# Patient Record
Sex: MALE | Employment: OTHER | URBAN - METROPOLITAN AREA
[De-identification: names, ages, dates, MRNs, and addresses within clinical notes are randomized per-mention and may not be internally consistent; named-entity substitution may affect disease eponyms.]

---

## 2017-06-26 ENCOUNTER — HOSPITAL ENCOUNTER (OUTPATIENT)
Dept: NEUROLOGY | Facility: CLINIC | Age: 72
Setting detail: THERAPIES SERIES
Discharge: STILL ADMITTED OR ADMINISTRATIVE | End: 2017-06-26
Attending: PSYCHIATRY & NEUROLOGY

## 2017-06-26 ENCOUNTER — HOSPITAL ENCOUNTER (OUTPATIENT)
Dept: NEUROLOGY | Facility: CLINIC | Age: 72
Setting detail: THERAPIES SERIES
Discharge: STILL ADMITTED OR ADMINISTRATIVE | End: 2017-06-26
Attending: SOCIAL WORKER

## 2017-06-26 DIAGNOSIS — E03.9 HYPOTHYROID: ICD-10-CM

## 2017-06-26 DIAGNOSIS — G20.C PARKINSONISM (H): ICD-10-CM

## 2017-06-26 DIAGNOSIS — R41.89 COGNITIVE IMPAIRMENT: ICD-10-CM

## 2017-06-28 ENCOUNTER — HOSPITAL ENCOUNTER (OUTPATIENT)
Dept: RADIOLOGY | Facility: HOSPITAL | Age: 72
Discharge: HOME | End: 2017-06-28
Attending: PSYCHIATRY & NEUROLOGY

## 2017-06-28 DIAGNOSIS — R41.89 COGNITIVE IMPAIRMENT: ICD-10-CM

## 2017-07-06 ENCOUNTER — HOSPITAL ENCOUNTER (OUTPATIENT)
Dept: PHYSICAL THERAPY | Age: 72
Setting detail: THERAPIES SERIES
Discharge: STILL ADMITTED OR ADMINISTRATIVE | End: 2017-07-06
Attending: PSYCHIATRY & NEUROLOGY

## 2017-07-06 DIAGNOSIS — R26.81 GAIT INSTABILITY: ICD-10-CM

## 2017-07-07 ENCOUNTER — HOSPITAL ENCOUNTER (OUTPATIENT)
Dept: NEUROLOGY | Facility: CLINIC | Age: 72
Setting detail: THERAPIES SERIES
Discharge: STILL ADMITTED OR ADMINISTRATIVE | End: 2017-07-07
Attending: PSYCHIATRY & NEUROLOGY

## 2017-07-07 DIAGNOSIS — G47.52 REM BEHAVIORAL DISORDER: ICD-10-CM

## 2017-07-07 DIAGNOSIS — G20.C PARKINSONISM (H): ICD-10-CM

## 2017-07-07 DIAGNOSIS — F03.91 MAJOR NEUROCOGNITIVE DISORDER DUE TO PARKINSON'S DISEASE, PROBABLE, WITH BEHAVIORAL DISTURBANCE: ICD-10-CM

## 2017-07-11 ENCOUNTER — HOSPITAL ENCOUNTER (OUTPATIENT)
Dept: MRI IMAGING | Facility: CLINIC | Age: 72
Discharge: HOME | End: 2017-07-11
Attending: PSYCHIATRY & NEUROLOGY

## 2017-07-11 DIAGNOSIS — R41.89 COGNITIVE IMPAIRMENT: ICD-10-CM

## 2017-07-17 ENCOUNTER — HOSPITAL ENCOUNTER (OUTPATIENT)
Dept: NEUROLOGY | Facility: CLINIC | Age: 72
Setting detail: THERAPIES SERIES
Discharge: STILL ADMITTED OR ADMINISTRATIVE | End: 2017-07-17
Attending: PSYCHIATRY & NEUROLOGY

## 2017-07-18 ENCOUNTER — COMMUNICATION - HEALTHEAST (OUTPATIENT)
Dept: NEUROLOGY | Facility: CLINIC | Age: 72
End: 2017-07-18

## 2017-07-24 ENCOUNTER — HOSPITAL ENCOUNTER (OUTPATIENT)
Dept: PHYSICAL THERAPY | Age: 72
Setting detail: THERAPIES SERIES
Discharge: STILL ADMITTED OR ADMINISTRATIVE | End: 2017-07-24
Attending: PSYCHIATRY & NEUROLOGY

## 2017-07-24 DIAGNOSIS — R26.81 GAIT INSTABILITY: ICD-10-CM

## 2017-07-24 DIAGNOSIS — R25.8 BRADYKINESIA: ICD-10-CM

## 2017-07-26 ENCOUNTER — HOSPITAL ENCOUNTER (OUTPATIENT)
Dept: PHYSICAL THERAPY | Age: 72
Setting detail: THERAPIES SERIES
Discharge: STILL ADMITTED OR ADMINISTRATIVE | End: 2017-07-26
Attending: PSYCHIATRY & NEUROLOGY

## 2017-07-26 DIAGNOSIS — R25.8 BRADYKINESIA: ICD-10-CM

## 2017-07-26 DIAGNOSIS — R26.81 GAIT INSTABILITY: ICD-10-CM

## 2017-07-31 ENCOUNTER — HOSPITAL ENCOUNTER (OUTPATIENT)
Dept: PHYSICAL THERAPY | Age: 72
Setting detail: THERAPIES SERIES
Discharge: STILL ADMITTED OR ADMINISTRATIVE | End: 2017-07-31
Attending: PSYCHIATRY & NEUROLOGY

## 2017-07-31 DIAGNOSIS — R25.8 BRADYKINESIA: ICD-10-CM

## 2017-07-31 DIAGNOSIS — R26.81 GAIT INSTABILITY: ICD-10-CM

## 2017-08-02 ENCOUNTER — HOSPITAL ENCOUNTER (OUTPATIENT)
Dept: PHYSICAL THERAPY | Age: 72
Setting detail: THERAPIES SERIES
Discharge: STILL ADMITTED OR ADMINISTRATIVE | End: 2017-08-02
Attending: PSYCHIATRY & NEUROLOGY

## 2017-08-02 DIAGNOSIS — R25.8 BRADYKINESIA: ICD-10-CM

## 2017-08-02 DIAGNOSIS — R26.81 GAIT INSTABILITY: ICD-10-CM

## 2017-08-07 ENCOUNTER — HOSPITAL ENCOUNTER (OUTPATIENT)
Dept: PHYSICAL THERAPY | Age: 72
Setting detail: THERAPIES SERIES
Discharge: STILL ADMITTED OR ADMINISTRATIVE | End: 2017-08-07
Attending: PSYCHIATRY & NEUROLOGY

## 2017-08-07 DIAGNOSIS — R26.81 GAIT INSTABILITY: ICD-10-CM

## 2017-08-07 DIAGNOSIS — R25.8 BRADYKINESIA: ICD-10-CM

## 2017-08-09 ENCOUNTER — HOSPITAL ENCOUNTER (OUTPATIENT)
Dept: PHYSICAL THERAPY | Age: 72
Setting detail: THERAPIES SERIES
Discharge: STILL ADMITTED OR ADMINISTRATIVE | End: 2017-08-09
Attending: PSYCHIATRY & NEUROLOGY

## 2017-08-09 DIAGNOSIS — R25.8 BRADYKINESIA: ICD-10-CM

## 2017-08-09 DIAGNOSIS — R26.81 GAIT INSTABILITY: ICD-10-CM

## 2017-08-14 ENCOUNTER — HOSPITAL ENCOUNTER (OUTPATIENT)
Dept: PHYSICAL THERAPY | Age: 72
Setting detail: THERAPIES SERIES
Discharge: STILL ADMITTED OR ADMINISTRATIVE | End: 2017-08-14
Attending: PSYCHIATRY & NEUROLOGY

## 2017-08-14 DIAGNOSIS — R25.8 BRADYKINESIA: ICD-10-CM

## 2017-08-14 DIAGNOSIS — R26.81 GAIT INSTABILITY: ICD-10-CM

## 2017-08-16 ENCOUNTER — HOSPITAL ENCOUNTER (OUTPATIENT)
Dept: PHYSICAL THERAPY | Age: 72
Setting detail: THERAPIES SERIES
Discharge: STILL ADMITTED OR ADMINISTRATIVE | End: 2017-08-16
Attending: PSYCHIATRY & NEUROLOGY

## 2017-08-16 DIAGNOSIS — R26.81 GAIT INSTABILITY: ICD-10-CM

## 2017-08-16 DIAGNOSIS — R25.8 BRADYKINESIA: ICD-10-CM

## 2017-08-21 ENCOUNTER — HOSPITAL ENCOUNTER (OUTPATIENT)
Dept: NEUROLOGY | Facility: CLINIC | Age: 72
Setting detail: THERAPIES SERIES
Discharge: STILL ADMITTED OR ADMINISTRATIVE | End: 2017-08-21
Attending: PSYCHIATRY & NEUROLOGY

## 2017-08-21 DIAGNOSIS — G20.A1 PARKINSON'S DISEASE (H): ICD-10-CM

## 2017-11-01 ENCOUNTER — COMMUNICATION - HEALTHEAST (OUTPATIENT)
Dept: NEUROLOGY | Facility: CLINIC | Age: 72
End: 2017-11-01

## 2017-11-02 ENCOUNTER — COMMUNICATION - HEALTHEAST (OUTPATIENT)
Dept: NEUROLOGY | Facility: CLINIC | Age: 72
End: 2017-11-02

## 2017-11-02 DIAGNOSIS — G20.A1 PARKINSON'S DISEASE (H): ICD-10-CM

## 2017-11-03 ENCOUNTER — COMMUNICATION - HEALTHEAST (OUTPATIENT)
Dept: NEUROLOGY | Facility: CLINIC | Age: 72
End: 2017-11-03

## 2018-04-25 ENCOUNTER — HOSPITAL ENCOUNTER (OUTPATIENT)
Dept: NEUROLOGY | Facility: CLINIC | Age: 73
Setting detail: THERAPIES SERIES
Discharge: STILL ADMITTED OR ADMINISTRATIVE | End: 2018-04-25
Attending: PSYCHIATRY & NEUROLOGY

## 2018-04-25 DIAGNOSIS — G20.A1 PARKINSON'S DISEASE (H): ICD-10-CM

## 2018-05-16 ENCOUNTER — HOSPITAL ENCOUNTER (OUTPATIENT)
Dept: PHYSICAL THERAPY | Age: 73
Setting detail: THERAPIES SERIES
Discharge: STILL ADMITTED OR ADMINISTRATIVE | End: 2018-05-16
Attending: PSYCHIATRY & NEUROLOGY

## 2018-05-16 ENCOUNTER — AMBULATORY - HEALTHEAST (OUTPATIENT)
Dept: NEUROLOGY | Facility: CLINIC | Age: 73
End: 2018-05-16

## 2018-05-16 DIAGNOSIS — G20.A1 PARKINSON'S DISEASE (H): ICD-10-CM

## 2018-05-16 DIAGNOSIS — R26.81 GAIT INSTABILITY: ICD-10-CM

## 2018-06-27 ENCOUNTER — HOSPITAL ENCOUNTER (OUTPATIENT)
Dept: NEUROLOGY | Facility: CLINIC | Age: 73
Setting detail: THERAPIES SERIES
Discharge: STILL ADMITTED OR ADMINISTRATIVE | End: 2018-06-27
Attending: PSYCHIATRY & NEUROLOGY

## 2018-06-27 DIAGNOSIS — G20.A1 PARKINSON DISEASE (H): ICD-10-CM

## 2018-06-27 DIAGNOSIS — I10 HTN (HYPERTENSION), BENIGN: ICD-10-CM

## 2018-06-27 DIAGNOSIS — R41.3 MEMORY DIFFICULTY: ICD-10-CM

## 2018-07-05 ENCOUNTER — COMMUNICATION - HEALTHEAST (OUTPATIENT)
Dept: NEUROLOGY | Facility: CLINIC | Age: 73
End: 2018-07-05

## 2018-07-05 DIAGNOSIS — G20.A1 PARKINSON'S DISEASE (H): ICD-10-CM

## 2018-09-26 ENCOUNTER — HOSPITAL ENCOUNTER (OUTPATIENT)
Dept: NEUROLOGY | Facility: CLINIC | Age: 73
Setting detail: THERAPIES SERIES
Discharge: STILL ADMITTED OR ADMINISTRATIVE | End: 2018-09-26
Attending: PSYCHIATRY & NEUROLOGY

## 2018-09-26 DIAGNOSIS — G20.A1 PARKINSON'S DISEASE (H): ICD-10-CM

## 2018-10-01 ENCOUNTER — COMMUNICATION - HEALTHEAST (OUTPATIENT)
Dept: NEUROLOGY | Facility: CLINIC | Age: 73
End: 2018-10-01

## 2019-04-03 ENCOUNTER — AMBULATORY - HEALTHEAST (OUTPATIENT)
Dept: NEUROLOGY | Facility: CLINIC | Age: 74
End: 2019-04-03

## 2019-04-03 DIAGNOSIS — G20.A1 PARKINSON'S DISEASE (H): ICD-10-CM

## 2019-05-01 ENCOUNTER — HOSPITAL ENCOUNTER (OUTPATIENT)
Dept: NEUROLOGY | Facility: CLINIC | Age: 74
Setting detail: THERAPIES SERIES
Discharge: STILL ADMITTED OR ADMINISTRATIVE | End: 2019-05-01
Attending: PSYCHIATRY & NEUROLOGY

## 2019-05-01 DIAGNOSIS — I10 HTN (HYPERTENSION), BENIGN: ICD-10-CM

## 2019-05-01 DIAGNOSIS — G47.52 REM BEHAVIORAL DISORDER: ICD-10-CM

## 2019-05-01 DIAGNOSIS — G20.A1 PARKINSON'S DISEASE (H): ICD-10-CM

## 2019-05-01 DIAGNOSIS — R41.3 MEMORY DIFFICULTY: ICD-10-CM

## 2019-05-22 ENCOUNTER — HOSPITAL ENCOUNTER (OUTPATIENT)
Dept: PHYSICAL THERAPY | Age: 74
Setting detail: THERAPIES SERIES
Discharge: STILL ADMITTED OR ADMINISTRATIVE | End: 2019-05-22
Attending: PSYCHIATRY & NEUROLOGY

## 2019-05-22 DIAGNOSIS — R68.89 DECREASED ACTIVITY TOLERANCE: ICD-10-CM

## 2019-10-02 ENCOUNTER — HOSPITAL ENCOUNTER (OUTPATIENT)
Dept: NEUROLOGY | Facility: CLINIC | Age: 74
Setting detail: THERAPIES SERIES
Discharge: STILL ADMITTED OR ADMINISTRATIVE | End: 2019-10-02
Attending: PSYCHIATRY & NEUROLOGY

## 2019-10-02 DIAGNOSIS — G20.A1 PARKINSON'S DISEASE (H): ICD-10-CM

## 2019-10-02 DIAGNOSIS — G47.52 REM BEHAVIORAL DISORDER: ICD-10-CM

## 2019-10-02 DIAGNOSIS — G20.A1 PARKINSON DISEASE (H): ICD-10-CM

## 2019-10-02 DIAGNOSIS — F02.80 LEWY BODY DEMENTIA WITHOUT BEHAVIORAL DISTURBANCE (H): ICD-10-CM

## 2019-10-02 DIAGNOSIS — G31.83 LEWY BODY DEMENTIA WITHOUT BEHAVIORAL DISTURBANCE (H): ICD-10-CM

## 2020-01-17 ENCOUNTER — COMMUNICATION - HEALTHEAST (OUTPATIENT)
Dept: NEUROLOGY | Facility: CLINIC | Age: 75
End: 2020-01-17

## 2020-08-06 NOTE — TELEPHONE ENCOUNTER
Informed Wife Rx was done at 8:30 am. It went to [facility] in [place] so that was wrong. I resent Rx to [facility].  Joanne Arias on 8/6/2020 at 1:13 PM

## 2020-08-06 NOTE — TELEPHONE ENCOUNTER
Pt wife left msg requesting Donepezil refill. She states pharmacy has tried reaching but is unable.

## 2020-09-08 ENCOUNTER — TELEPHONE (OUTPATIENT)
Dept: NEUROLOGY | Facility: CLINIC | Age: 75
End: 2020-09-08

## 2020-09-08 NOTE — TELEPHONE ENCOUNTER
He is scheduled for 10/5 for follow up video visit with you  and they are leaving for the winter soon and they are requesting that this be an in person visit and not video. Will you ok this to be a face to face visit? The wife states they both have been healthy during this pandemic and feel it would be ok to come in person. Thank you Nayeli

## 2020-09-14 DIAGNOSIS — G20.A1 PARKINSON DISEASE (H): Primary | ICD-10-CM

## 2020-09-14 RX ORDER — CARBIDOPA AND LEVODOPA 50; 200 MG/1; MG/1
TABLET, EXTENDED RELEASE ORAL
COMMUNITY
Start: 2019-10-02 | End: 2020-09-14

## 2020-09-14 RX ORDER — CARBIDOPA AND LEVODOPA 50; 200 MG/1; MG/1
1 TABLET, EXTENDED RELEASE ORAL AT BEDTIME
Qty: 90 TABLET | Refills: 1 | Status: SHIPPED | OUTPATIENT
Start: 2020-09-14 | End: 2021-03-15

## 2020-09-15 DIAGNOSIS — G20.A1 PARKINSON DISEASE (H): Primary | ICD-10-CM

## 2020-09-15 RX ORDER — CARBIDOPA AND LEVODOPA 25; 100 MG/1; MG/1
1.5 TABLET ORAL
COMMUNITY
Start: 2019-10-02 | End: 2020-09-15

## 2020-09-15 RX ORDER — CARBIDOPA AND LEVODOPA 25; 100 MG/1; MG/1
1.5 TABLET ORAL 3 TIMES DAILY
Qty: 135 TABLET | Refills: 1 | Status: SHIPPED | OUTPATIENT
Start: 2020-09-15 | End: 2020-10-05

## 2020-10-05 ENCOUNTER — OFFICE VISIT (OUTPATIENT)
Dept: NEUROLOGY | Facility: CLINIC | Age: 75
End: 2020-10-05
Payer: COMMERCIAL

## 2020-10-05 VITALS
BODY MASS INDEX: 25.11 KG/M2 | HEART RATE: 80 BPM | WEIGHT: 160 LBS | DIASTOLIC BLOOD PRESSURE: 82 MMHG | HEIGHT: 67 IN | SYSTOLIC BLOOD PRESSURE: 144 MMHG

## 2020-10-05 DIAGNOSIS — G31.83 LEWY BODY DEMENTIA WITHOUT BEHAVIORAL DISTURBANCE (H): ICD-10-CM

## 2020-10-05 DIAGNOSIS — G20.A1 PARKINSON DISEASE (H): Primary | ICD-10-CM

## 2020-10-05 DIAGNOSIS — G47.52 REM SLEEP BEHAVIOR DISORDER: ICD-10-CM

## 2020-10-05 DIAGNOSIS — F02.80 LEWY BODY DEMENTIA WITHOUT BEHAVIORAL DISTURBANCE (H): ICD-10-CM

## 2020-10-05 DIAGNOSIS — H90.3 SENSORINEURAL HEARING LOSS OF BOTH EARS: ICD-10-CM

## 2020-10-05 PROBLEM — I10 BENIGN ESSENTIAL HYPERTENSION: Status: ACTIVE | Noted: 2018-06-27

## 2020-10-05 PROBLEM — R41.3 MEMORY DIFFICULTY: Status: ACTIVE | Noted: 2018-06-27

## 2020-10-05 PROBLEM — R42 DIZZINESS: Status: ACTIVE | Noted: 2020-10-05

## 2020-10-05 PROCEDURE — 99214 OFFICE O/P EST MOD 30 MIN: CPT | Performed by: PSYCHIATRY & NEUROLOGY

## 2020-10-05 RX ORDER — CALCIUM CARBONATE/VITAMIN D3 500-10/5ML
LIQUID (ML) ORAL
COMMUNITY

## 2020-10-05 RX ORDER — DONEPEZIL HYDROCHLORIDE 10 MG/1
TABLET, FILM COATED ORAL
COMMUNITY
Start: 2020-02-03 | End: 2021-06-23

## 2020-10-05 RX ORDER — CARBIDOPA AND LEVODOPA 25; 100 MG/1; MG/1
1.5 TABLET ORAL 3 TIMES DAILY
Qty: 405 TABLET | Refills: 3 | Status: SHIPPED | OUTPATIENT
Start: 2020-10-05 | End: 2021-06-23

## 2020-10-05 RX ORDER — MULTIPLE VITAMINS W/ MINERALS TAB 9MG-400MCG
1 TAB ORAL DAILY
COMMUNITY

## 2020-10-05 SDOH — HEALTH STABILITY: MENTAL HEALTH: HOW OFTEN DO YOU HAVE 6 OR MORE DRINKS ON ONE OCCASION?: NOT ASKED

## 2020-10-05 SDOH — HEALTH STABILITY: MENTAL HEALTH: HOW OFTEN DO YOU HAVE A DRINK CONTAINING ALCOHOL?: 2-3 TIMES A WEEK

## 2020-10-05 SDOH — HEALTH STABILITY: MENTAL HEALTH: HOW MANY STANDARD DRINKS CONTAINING ALCOHOL DO YOU HAVE ON A TYPICAL DAY?: NOT ASKED

## 2020-10-05 ASSESSMENT — MIFFLIN-ST. JEOR: SCORE: 1409.39

## 2020-10-05 NOTE — LETTER
10/5/2020         RE: Gaurav Aldridge  [address] [facility] [address]        Dear Colleague,    Thank you for referring your patient, Gaurav Aldridge, to the [facility] [place]. Please see a copy of my visit note below.        NEUROLOGY NOTE        Assessment/Plan            Parkinson disease: On Sinemet 25/100, 1.5 tablet 3 times.  50/200 1 tablet at bedtime.  No side effects of medication.  No clear wearing off.  Some occasional dyskinesia in feet.    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 Does not want to be tested for memory.    [place] 20/30 on 10/2/2019 not interested in any medication.    REM sleep behavior.  Melatonin helping.     Plan:     Continue Sinemet (carbidopa/levodopa) 25/100 down to 1.5 tablets 3 times a day for 1 month    Continue carbidopa/levodopa, 50/200, 1 tablet at bedtime.    Continue melatonin 3 mg at bedtime to reduce vivid dreams and creamy enactments.      We can clinical visit when you return from [place] next year.    Continue donepezil 10 mg daily              SUBJECTIVE           Better with hallucinations and reduced Sinemet 1.5 tablet 3 times daily and donepezil.    Ms. better melatonin.    Consent disease: Reduced from 2 tablets down to 1.5 tablets of Sinemet 25/100 3 times daily.  No side effects or wearing off.    Some dyskinesia at times in feet.  On Sinemet CR 50/200 nightly.         Sleep: Has a lot of vivid dreams and some enactment of dreams.  No injuries to patient and partners.  The Sinemet CR 50/200 helped to get more restful sleep in general, at the lower dosage was taking up and moving around a lot.  Add melatonin 3 mg at bedtime for trial.     Psych: OK, fairly happy.     Falls: None     Autonomic: No orthostasis.  But he reports some lightheadedness and dizziness at times with standing position in the summer.  Recommended to increase fluid intake.  Numbness in feet.     Memory: No hallucinations. Usually remembering to  take meds.  [place] scored 20/30 on 10/2/2019     Therapy/exercise: Deferred therapy and exercising daily.  Walking dog.     ADL: Quit driving [place] in 2018.     History review:  In 2015 he was noted to have some cognitive and memory difficulty.  He needed some direction to navigate in unfamiliar environment or new locations.  In 2016 noted some imbalance and poor coordination.  Started in 2017 hand tremor started with left side and within 1 year spread to the other side.  His general motor function slowed down including ambulation.  His speech came soft.  Some drooling when he sleeps.  He has had some REM sleep behavior including screaming and daydreaming initially noted in 2013.Hand writing has becoming less legible.     He was started on carbidopa levodopa in 2016 with benefit.  In general he felt better with tremor and coordination and motor function little faster.  He is able to put his clothes on easier.  The benefit definitely noticeable.  When he forgets to take his pills his wife would not notice the difference. In 9/2018 we did ON/OFF testing with improvement in his tone and bradykinesia. This makes idiopathic Parkinson Disease a possible diagnosis. However will still have to consider an atypical parkinsonism, perhaps Lewy Body Dementia given his preceding cognitive changes.     For his waking at night will add a CR Sinemet as it may be related to some shoulder stiffness. Will also increase Sinemet dose as he did well on 2 tabs on ON/OFF testing today.        In Dr. Guajardo's note there was some documentation of blood pressure drop.  However again today's clinical exam he did not demonstrate orthostatic hypotension.  He denies any significant lightheadedness or dizziness feelings.  Does not feel fatigued or tired.  No shoulder pains.  Occasionally he feels tingling in his body.     No falls and does not walk with any assisted device.     Has had therapy and staying very active.     No family history of Parkinson  "disease. His parents  in the .  Mother had breast cancer and heart disease, his father passed away 6 months later after his mom passed.     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     Workups  MRI of the brain 2017 showing small vessel ischemic changes otherwise unremarkable.               Review of system     10 point system review otherwise unremarkable    PHYSICAL EXAMINATION     Vital signs in last 24 hours:  Vitals:    10/05/20 1130   BP: (!) 144/82   Pulse: 80   Weight: 72.6 kg (160 lb)   Height: 1.702 m (5' 7\")         No acute distress sitting in chair.  Very pleasant.  Atraumatic and normocephalic.  Exam of the head, eyes, head unremarkable.  Mood is fine.  Cognitive function compromised.  Direct loss of question to his wife.  Normal mental status, language.  Slightly soft speech.  Cranial nerves II through XII significant for little bit hard of hearing and mild limitation of upgaze.  Adequate muscle bulk and strength in 4 extremities with increased muscle tone slightly worse on the left side.  Slightly compromised coordination.  Slightly wide-based gait and slightly stooped over.      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     Priority: Medium     Dizziness 10/05/2020     Priority: Medium     Parkinson disease (H) 2018     Priority: Medium     Memory difficulty 2018     Priority: Medium     Benign essential hypertension 2018     Priority: Medium     Sensorineural hearing loss of both ears 2017     Priority: Medium     Chest pain on exertion 10/20/2010     Priority: Medium         Past medical history     Body dementia      Family history     No family history of Parkinson disease    Social history     Social History     Socioeconomic History     Marital status:      Spouse name: Not on file     Number of children: Not on file     Years of education: Not " on file     Highest education level: Not on file   Occupational History     Not on file   Social Needs     Financial resource strain: Not on file     Food insecurity     Worry: Not on file     Inability: Not on file     Transportation needs     Medical: Not on file     Non-medical: Not on file   Tobacco Use     Smoking status: Former Smoker     Smokeless tobacco: Never Used   Substance and Sexual Activity     Alcohol use: Yes     Frequency: 2-3 times a week     Drug use: Not on file     Sexual activity: Not on file   Lifestyle     Physical activity     Days per week: Not on file     Minutes per session: Not on file     Stress: Not on file   Relationships     Social connections     Talks on phone: Not on file     Gets together: Not on file     Attends Zoroastrianism service: Not on file     Active member of club or organization: Not on file     Attends meetings of clubs or organizations: Not on file     Relationship status: Not on file     Intimate partner violence     Fear of current or ex partner: Not on file     Emotionally abused: Not on file     Physically abused: Not on file     Forced sexual activity: Not on file   Other Topics Concern     Not on file   Social History Narrative     Not on file         Allergy     Patient has no known allergies.    MEDICATIONS List     Current Outpatient Medications   Medication Sig Dispense Refill     Aspirin Buf,CaCarb-MgCarb-MgO, 81 MG TABS Take 81 mg by mouth       Calcium Carb-Cholecalciferol 600-400 MG-UNIT CAPS        carbidopa-levodopa (SINEMET CR)  MG CR tablet Take 1 tablet by mouth At Bedtime 90 tablet 1     carbidopa-levodopa (SINEMET)  MG tablet Take 1.5 tablets by mouth 3 times daily 135 tablet 1     donepezil (ARICEPT) 10 MG tablet        magnesium oxide 400 MG CAPS        melatonin 3 MG CAPS        multivitamin w/minerals (MULTI-VITAMIN) tablet Take 1 tablet by mouth daily                   RESULTS REVIEW         Corazon Michael MD, MD, PhD  Neurology   Office  tel: [phone]          Again, thank you for allowing me to participate in the care of your patient.        Sincerely,        Corazon Michael MD

## 2020-10-05 NOTE — PROGRESS NOTES
NEUROLOGY NOTE        Assessment/Plan            Parkinson disease: On Sinemet 25/100, 1.5 tablet 3 times.  50/200 1 tablet at bedtime.  No side effects of medication.  No clear wearing off.  Some occasional dyskinesia in feet.    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 Does not want to be tested for memory.    [place] 20/30 on 10/2/2019 not interested in any medication.    REM sleep behavior.  Melatonin helping.     Plan:     Continue Sinemet (carbidopa/levodopa) 25/100 down to 1.5 tablets 3 times a day for 1 month    Continue carbidopa/levodopa, 50/200, 1 tablet at bedtime.    Continue melatonin 3 mg at bedtime to reduce vivid dreams and creamy enactments.      We can clinical visit when you return from [place] next year.    Continue donepezil 10 mg daily              SUBJECTIVE           Better with hallucinations and reduced Sinemet 1.5 tablet 3 times daily and donepezil.    Ms. better melatonin.    Consent disease: Reduced from 2 tablets down to 1.5 tablets of Sinemet 25/100 3 times daily.  No side effects or wearing off.    Some dyskinesia at times in feet.  On Sinemet CR 50/200 nightly.         Sleep: Has a lot of vivid dreams and some enactment of dreams.  No injuries to patient and partners.  The Sinemet CR 50/200 helped to get more restful sleep in general, at the lower dosage was taking up and moving around a lot.  Add melatonin 3 mg at bedtime for trial.     Psych: OK, fairly happy.     Falls: None     Autonomic: No orthostasis.  But he reports some lightheadedness and dizziness at times with standing position in the summer.  Recommended to increase fluid intake.  Numbness in feet.     Memory: No hallucinations. Usually remembering to take meds.  [place] scored 20/30 on 10/2/2019     Therapy/exercise: Deferred therapy and exercising daily.  Walking dog.     ADL: Quit driving [place] in 2018.     History review:  In 2015 he was noted to have some cognitive and memory difficulty.   He needed some direction to navigate in unfamiliar environment or new locations.  In 2016 noted some imbalance and poor coordination.  Started in 2017 hand tremor started with left side and within 1 year spread to the other side.  His general motor function slowed down including ambulation.  His speech came soft.  Some drooling when he sleeps.  He has had some REM sleep behavior including screaming and daydreaming initially noted in 2013.Hand writing has becoming less legible.     He was started on carbidopa levodopa in 2016 with benefit.  In general he felt better with tremor and coordination and motor function little faster.  He is able to put his clothes on easier.  The benefit definitely noticeable.  When he forgets to take his pills his wife would not notice the difference. In 2018 we did ON/OFF testing with improvement in his tone and bradykinesia. This makes idiopathic Parkinson Disease a possible diagnosis. However will still have to consider an atypical parkinsonism, perhaps Lewy Body Dementia given his preceding cognitive changes.     For his waking at night will add a CR Sinemet as it may be related to some shoulder stiffness. Will also increase Sinemet dose as he did well on 2 tabs on ON/OFF testing today.        In Dr. Guajardo's note there was some documentation of blood pressure drop.  However again today's clinical exam he did not demonstrate orthostatic hypotension.  He denies any significant lightheadedness or dizziness feelings.  Does not feel fatigued or tired.  No shoulder pains.  Occasionally he feels tingling in his body.     No falls and does not walk with any assisted device.     Has had therapy and staying very active.     No family history of Parkinson disease. His parents  in the 90s.  Mother had breast cancer and heart disease, his father passed away 6 months later after his mom passed.     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     Workups  MRI of the brain 12/2017 showing small vessel ischemic changes otherwise unremarkable.               Review of system     10 point system review otherwise unremarkable    PHYSICAL EXAMINATION     Vital signs in last 24 hours:  Vitals:    10/05/20 1130   BP: (!) 144/82   Pulse: 80   Weight: 72.6 kg (160 lb)   Height: 1.702 m (5' 7\")         No acute distress sitting in chair.  Very pleasant.  Atraumatic and normocephalic.  Exam of the head, eyes, head unremarkable.  Mood is fine.  Cognitive function compromised.  Direct loss of question to his wife.  Normal mental status, language.  Slightly soft speech.  Cranial nerves II through XII significant for little bit hard of hearing and mild limitation of upgaze.  Adequate muscle bulk and strength in 4 extremities with increased muscle tone slightly worse on the left side.  Slightly compromised coordination.  Slightly wide-based gait and slightly stooped over.      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     Priority: Medium     Dizziness 10/05/2020     Priority: Medium     Parkinson disease (H) 06/27/2018     Priority: Medium     Memory difficulty 06/27/2018     Priority: Medium     Benign essential hypertension 06/27/2018     Priority: Medium     Sensorineural hearing loss of both ears 04/13/2017     Priority: Medium     Chest pain on exertion 10/20/2010     Priority: Medium         Past medical history     Body dementia      Family history     No family history of Parkinson disease    Social history     Social History     Socioeconomic History     Marital status:      Spouse name: Not on file     Number of children: Not on file     Years of education: Not on file     Highest education level: Not on file   Occupational History     Not on file   Social Needs     Financial resource strain: Not on file     Food insecurity     Worry: Not on file     Inability: Not on file "     Transportation needs     Medical: Not on file     Non-medical: Not on file   Tobacco Use     Smoking status: Former Smoker     Smokeless tobacco: Never Used   Substance and Sexual Activity     Alcohol use: Yes     Frequency: 2-3 times a week     Drug use: Not on file     Sexual activity: Not on file   Lifestyle     Physical activity     Days per week: Not on file     Minutes per session: Not on file     Stress: Not on file   Relationships     Social connections     Talks on phone: Not on file     Gets together: Not on file     Attends Orthodox service: Not on file     Active member of club or organization: Not on file     Attends meetings of clubs or organizations: Not on file     Relationship status: Not on file     Intimate partner violence     Fear of current or ex partner: Not on file     Emotionally abused: Not on file     Physically abused: Not on file     Forced sexual activity: Not on file   Other Topics Concern     Not on file   Social History Narrative     Not on file         Allergy     Patient has no known allergies.    MEDICATIONS List     Current Outpatient Medications   Medication Sig Dispense Refill     Aspirin Buf,CaCarb-MgCarb-MgO, 81 MG TABS Take 81 mg by mouth       Calcium Carb-Cholecalciferol 600-400 MG-UNIT CAPS        carbidopa-levodopa (SINEMET CR)  MG CR tablet Take 1 tablet by mouth At Bedtime 90 tablet 1     carbidopa-levodopa (SINEMET)  MG tablet Take 1.5 tablets by mouth 3 times daily 135 tablet 1     donepezil (ARICEPT) 10 MG tablet        magnesium oxide 400 MG CAPS        melatonin 3 MG CAPS        multivitamin w/minerals (MULTI-VITAMIN) tablet Take 1 tablet by mouth daily                   RESULTS REVIEW         Corazon Michael MD, MD, PhD  Neurology   Office tel: [phone]

## 2020-12-03 ENCOUNTER — TELEPHONE (OUTPATIENT)
Dept: NEUROLOGY | Facility: CLINIC | Age: 75
End: 2020-12-03

## 2020-12-03 NOTE — TELEPHONE ENCOUNTER
Pt wife Nadira called requesting Dr. Michael to recommend a Neurologist in [place] or [place], [place]. [phone] okay to leave ms.

## 2020-12-04 NOTE — TELEPHONE ENCOUNTER
Please tell them that I do not know anybody personally.  However they can look under [facility] or the [place] [facility].  Thank you

## 2021-01-04 ENCOUNTER — HEALTH MAINTENANCE LETTER (OUTPATIENT)
Age: 76
End: 2021-01-04

## 2021-03-15 DIAGNOSIS — G20.A1 PARKINSON DISEASE (H): ICD-10-CM

## 2021-03-15 RX ORDER — CARBIDOPA AND LEVODOPA 50; 200 MG/1; MG/1
1 TABLET, EXTENDED RELEASE ORAL AT BEDTIME
Qty: 90 TABLET | Refills: 1 | Status: SHIPPED | OUTPATIENT
Start: 2021-03-15 | End: 2021-06-23

## 2021-03-31 ENCOUNTER — TELEPHONE (OUTPATIENT)
Dept: NEUROLOGY | Facility: CLINIC | Age: 76
End: 2021-03-31

## 2021-03-31 NOTE — TELEPHONE ENCOUNTER
Pt wife Nadira is requesting to speak with Dr. Michael. She would like to discuss Botox injections. [phone] - okay to leave msg.

## 2021-05-28 NOTE — PROGRESS NOTES
Assessment /Plan:    Parkinson disease: On Sinemet 25/100, 2 tablet 3 times.  50/200 1 tablet at bedtime.  No side effects of medication.  No clear wearing off.      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 Does not want to be tested for memory.      REM sleep behavior.  Melatonin helping.    Plan:   Continue current medication.  Follow-up in about 6-month.        Subjectively:  Feeling current dose of Sinemet is working.  The increase Sinemet dosage to 2 tablets 3 times daily seem to be helping..  No side effects of medications.  No wearing off or any side effects of medications.   On carbidopa levodopa 25/100, 2 tablets 3 times daily, Sinemet CR 50/500 nightly.       Sleep: Waking up a lot during the night. Shoulders feel numb. Sleeps 2-3 hours during day as well.     Psych: OK, fairly happy.     Falls: None     Autonomic: No orthostasis.  But he reports some lightheadedness and dizziness at times with standing position in the summer.  Recommended to increase fluid intake.  Numbness in feet.     Memory: No hallucinations. Usually remembering to take meds.     Therapy/exercise: Deferred therapy and exercising daily.  Walking dog.        History review:  In 2015 he was noted to have some cognitive and memory difficulty.  He needed some direction to navigate in unfamiliar environment or new locations.  In 2016 noted some imbalance and poor coordination.  Started in 2017 hand tremor started with left side and within 1 year spread to the other side.  His general motor function slowed down including ambulation.  His speech came soft.  Some drooling when he sleeps.  He has had some REM sleep behavior including screaming and daydreaming initially noted in 2013.Hand writing has becoming less legible.     He was started on carbidopa levodopa currently on 25/100, 1 tablet at about 7 AM, noon and 5 PM.  No side effects of medication and no wearing off effect.  In general he felt better with tremor  and coordination and motor function little faster.  He is able to put his clothes on easier.  The benefit definitely noticeable.  When he forgets to take his pills his wife would not notice the difference. In 2018 we did ON/OFF testing with improvement in his tone and bradykinesia. This makes idiopathic Parkinson Disease a possible diagnosis. However will still have to consider an atypical parkinsonism, perhaps Lewy Body Dementia given his preceding cognitive changes.     For his waking at night will add a CR Sinemet as it may be related to some shoulder stiffness. Will also increase Sinemet dose as he did well on 2 tabs on ON/OFF testing today.       In Dr. Guajardo's note there was some documentation of blood pressure drop.  However again today's clinical exam he did not demonstrate orthostatic hypotension.  He denies any significant lightheadedness or dizziness feelings.  Does not feel fatigued or tired.  No shoulder pains.  Occasionally he feels tingling in his body.     No falls and does not walk with any assisted device.     Has had therapy and staying very active.     No family history of Parkinson disease. His parents  in the 90s.  Mother had breast cancer and heart disease, his father passed away 6 months later after his mom passed.     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     Workups  MRI of the brain 2017 showing small vessel ischemic changes otherwise unremarkable.        10 point system review otherwise negative            Patient Active Problem List   Diagnosis     HTN (hypertension), benign     Memory difficulty     Parkinson disease (H)             Current Outpatient Medications   Medication Sig Dispense Refill     aspirin 81 mg chewable tablet Chew 81 mg daily.       calcium carbonate-vitamin D3 (CALCIUM 600 WITH VITAMIN D3) 600 mg(1,500mg) -400 unit cap Take by mouth.       carbidopa-levodopa (SINEMET CR)  mg ER tablet Take 1 tablet by mouth at  bedtime. 90 tablet 1     carbidopa-levodopa (SINEMET)  mg per tablet Take 1 tablet by mouth 3 (three) times a day. Take 30 minutes before meals 270 tablet 3     magnesium oxide 400 mg cap        multivitamin with minerals (THERA-M) 9 mg iron-400 mcg Tab tablet Take 1 tablet by mouth daily.       No current facility-administered medications for this encounter.        Patient has no known allergies.    History reviewed. No pertinent past medical history.    Social History     Socioeconomic History     Marital status:      Spouse name: Not on file     Number of children: Not on file     Years of education: Not on file     Highest education level: Not on file   Occupational History     Not on file   Social Needs     Financial resource strain: Not on file     Food insecurity:     Worry: Not on file     Inability: Not on file     Transportation needs:     Medical: Not on file     Non-medical: Not on file   Tobacco Use     Smoking status: Former Smoker     Smokeless tobacco: Never Used   Substance and Sexual Activity     Alcohol use: Yes     Alcohol/week: 0.6 oz     Types: 1 Glasses of wine per week     Drug use: Not on file     Sexual activity: Not on file   Lifestyle     Physical activity:     Days per week: Not on file     Minutes per session: Not on file     Stress: Not on file   Relationships     Social connections:     Talks on phone: Not on file     Gets together: Not on file     Attends Roman Catholic service: Not on file     Active member of club or organization: Not on file     Attends meetings of clubs or organizations: Not on file     Relationship status: Not on file     Intimate partner violence:     Fear of current or ex partner: Not on file     Emotionally abused: Not on file     Physically abused: Not on file     Forced sexual activity: Not on file   Other Topics Concern     Not on file   Social History Narrative     Not on file       Family History   Problem Relation Age of Onset     Dementia Neg  Hx      Parkinsonism Neg Hx        Objective:  Vitals:    05/01/19 1010   BP: 150/80   Pulse: 60   Weight: 166 lb (75.3 kg)       Much more expressive in face and no significant masked face.. Mild hypophonia.  No significant tremors.  No significant bradykinesia or hypokinesia.  No significant tremor.  Dyskinesia.      Cranial nerve II to XII significant for little hard of hearing.  Normal muscle bulk and strength in 4 extremities.  Increased muscle tone in general worse on the left side.  Hand coordination slightly compromised more on the left side.  Slightly wide-based gait but no trouble to get up from the chair.      Well dressed and groomed.  Atraumatic and normocephalic.  No edema or skin rashes.  Exam of the head, neck, eyes, ears, nose and mouth negative.

## 2021-05-29 NOTE — PROGRESS NOTES
"Physical Therapy  PHYSICAL THERAPY  MOVEMENT DISORDER:  INITIAL EVALUATION    SUBJECTIVE INFORMATION    Diagnosis: Parkinson's Disease  Date of diagnosis: 3/2018    Date of last Neurologist visit: May 2019, Dr. Corazon Michael  Treatment Diagnosis: decreased activity tolerance  Precautions/pmh: none noted  First movement disorder symptom presentation: Shuffling gait, memory issues  Date: 2017  Non-motor symptoms: Early mild cognitive impairment, Dementia, Orthostatic hypotension, Constipation, Fatigue and Restless Leg Syndrome  Pt up about 1x/night to move his legs around.      Time of Evaluation: 0       Time of last PD med: 7am       Time of next PD med: 12pm  On/Off presentation/symptoms: Yes, pt says his body works better when his medication \"kicks in\"  History of PD specific PT? Yes: When and where?: Summer 2017    Pain: No    Living Situation:[facility], 17 stairs to downstairs but everything they need is on the main level,including bed, bath, laundry, kitchen, living room.  They have no grab rails, toilets are higher than standard, they have a walk in shower.  Caregiver Support: Wife, retired  Equipment: No  Current Activity Level: Pt is very active.  He and his wife walk a couple of miles every day.  They live in [place] in the winter, where he plays golf 5x/week.  He does [employer] 2x/week (M/F), Parkinson's fitness class 1x/week (Thursday), and BIG exercises daily.  Chief Mobility Complaint: No complaints.    Patient's Functional Goal: Things are going well.  He has no goals at this time.      Fall history:None    Freezing: Never      OBJECTIVE INFORMATION    Cognition: comments: Pt's wife reports he has declining memory and cognition.     Bradykinesia and Hypokinesia (Combining slowness, hesitency, decreased arm swing, small amplitude and poverty of movement in general):  Minimal    Dyskinesia:No    Posture: Normal     Postural Stability: Posterior tug test (Response to sudden posterior " displacement produced by pull on shoulders while patient is erect, with eyes open and feet slightly apart.  Patient is prepared.)  Normal    Transitional Movements  Sit?Stand:   Independent       Sit? Supine:   Not Tested  360 degree turn:  5 steps    TUG(Timed Up and Go): 7.29 seconds    (>13 sec:  Falls Risk)        Divided Attention   Cognitive TUG:         10.88 seconds (Task):  Count backward from 60 by 3's  (60, 57, 54, 38, 31, 30, 28)   Motor TUG:    seconds (Task):     Cognitive and Motor TUG:  seconds (Task):      Strength   30 Second Chair Stand:  # 17  No UE support  Age/Gender Norm:12       Balance      Eyes open Eyes closed     Romberg (sec) 30+ sec 30+ sec     Semi-Romberg (sec)       Sharpened Romberg (sec)      Left LE Right LE     Single Leg Stance (sec) 20+ s 15.75 s      Balance Comments Pt has good static balance, decreased compensatory stepping reactions backward and to L lateral.  Pt has decreased dual tasking ability, requiring increased time to complete tasks and with accuracy of cognitive task diminished.       Balance Assessment: Mini-BEST 24/28 (<23/28)   1) Sit <> Stand: (2) Normal   2) Rise to Toes: (2) Normal   3) Stand on One Leg: (1) Moderate L: 20+; 20+  R: 9.82; 15.75  4) Compensatory Stepping Correction-Forward: (2) Normal   5) Compensatory Stepping Correction-Backward: (1) Moderate    Pt takes several small steps backward.    6) Compensatory Stepping Correction-Lateral: (1) Moderate L: (1) Moderate pt takes cross over step R: (2) Normal   7) Eyes Open, Firm Surface: (2) Normal   8) Eyes Closed, Foam Surface: (2) Normal  Able to maintain balance but with increased sway   9) Incline, Eyes Closed: (2) Normal   10) Change in Gait Speed: (2) Normal   11) Walk with Head Turns-Horizontal: (2) Normal  12) Walk with Pivot Turns: (2) Normal  13) Step Over Obstacles: (2) Normal   14) TUG with Dual Tasks: (1) Moderate  TU.29  (8.16, 6.41)  Cognitive TUG:  10.88       Pt walks past green  "line and stands for about a second prior to returning to sit during first trial.    Motor Planning / Coordination   Four Square Step Test Trial 1: 9.12   Trial 2: 8.72     >15 sec:  Falls Risk        8.72  seconds (best of 2 trials)    Comments: gave instructions to change direction on first trial.  8.72    Gait   Preferred Gait Speed  6 meters in 4.57 seconds Meters/second: 1.31 Age/Gender Norm:  1.38 +/-.23 meters/second  # steps in 6 meters: 9  Assistive Device:None  Gait Speed Fall risk:  Low Falls Risk (>1.2 m/s)  Gait Analysis: Pt has good step length, decreased brennon arm swing.    Fast Gait Speed  6 meters in 3.79 seconds Meters/second: 1.58 Age/Gender Norm:   1.83 +/-.44 meters/second  # steps in 6 meters: 8  Assistive Device: None  Gait Analysis: Pt has good step length, decreased brennon arm swing    Activity Tolerance   6 minute walk test:  1624 feet       Age/Gender Norm: 1,665 feet     [employer] Medicare Advantage   [id number]  Provider #   Certification Dates: from 5/22/19 to 5/22/19    Assessment:  Pt is a 73 year old male who returns for follow up evaluation d/t progressive nature of Parkinson's Disease.  He recently moved into a [facility] with his wife, with all necessary rooms available on main floor, including bed/bath with walk-in shower/laundry/kitchen.  They have 17 stairs that go down to the basement where there is a large additional living room and bar.  Pt is very physically active, going for a couple of mile walk every day, completing [employer] 2x/week, Parkinson's specific fitness class 1x/week, BIG exercises daily, and when in [place] for the winter, playing golf 5x/week.  Pt reports no concerns about his mobility and said he feels like \"things are going well\".  His wife does report some decreases in pt's memory and cognition.  Pt scored 24/28 on the Mini-Best test, indicating he is not at risk for falls.  He also scored as not at risk for falls on the TUG and 4 square step " test.  Pt's gait speed and fast gait speed are within the standard deviation for age/gender norms. Pt scored slightly below age/gender norms for the 6 minute walk test, indicating a slightly decreased tolerance for activity.  Pt scored above age/gender norms for 30 second STS, indicating good LE strength.  Pt demonstrates decreased cognition for 4 square step test, TUG, and Cog TUG, requiring a visual and verbal demonstration, with attention to direction initially. Pt was then able to understand and complete those activities correctly.  Pt encouraged to continue staying physically active, and to begin practicing cognitive tasks during daily walks with his wife by quizzing each other.  Therapist recommended pt to follow up when neurologist recommends another physical therapy evaluation d/t the progressive nature of the disease.      Sita Adler, SPT  SPT under direct supervision of PT with PT directing treatment and plan of care.  Paul Abraham, PT/DPT    Total OP Minutes: 58     Rx Units: 1 OP PT SANCHEZ

## 2021-05-31 VITALS — WEIGHT: 164 LBS

## 2021-05-31 VITALS — WEIGHT: 163 LBS

## 2021-06-01 VITALS — WEIGHT: 164 LBS

## 2021-06-01 NOTE — PATIENT INSTRUCTIONS - HE
Reduce the medication of Sinemet (carbidopa/levodopa) 25/100 down to 1.5 tablets 3 times a day for 1 month, if no worsening of motor function okay to reduce to 1 tablet each time after 1 month.    Continue carbidopa/levodopa, 50/200, 1 tablet at bedtime.    Add melatonin 3 mg at bedtime to reduce vivid dreams and creamy enactments.  This will also potentially help with more effective sleep.    For memory and cognitive function, start Aricept which is also called donepezil 10 mg/tab, initially take 5 mg that is 1/2 tablet in the evening for 2 weeks if tolerated no side effects then increase to a full tablet to continue.    We can clinical visit when you return from [place] next year.

## 2021-06-01 NOTE — PROGRESS NOTES
Assessment /Plan:    Parkinson disease: On Sinemet 25/100, 2 tablet 3 times.  50/200 1 tablet at bedtime.  No side effects of medication.  No clear wearing off.      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 Does not want to be tested for memory.    [place] 20/30 on 10/2/2019    REM sleep behavior.  Melatonin helping.     Plan:     Reduce the medication of Sinemet (carbidopa/levodopa) 25/100 down to 1.5 tablets 3 times a day for 1 month, if no worsening of motor function okay to reduce to 1 tablet each time after 1 month.    Continue carbidopa/levodopa, 50/200, 1 tablet at bedtime.    Add melatonin 3 mg at bedtime to reduce vivid dreams and creamy enactments.  This will also potentially help with more effective sleep.    We can clinical visit when you return from [place] next year.    Start donepezil half tablets of 10 mg/pill for 2 weeks if tolerated then increase to a full tablet to continue        Subjectively:  Has been having some hallucinations, seeing his brother and talking to him while he was not there.  There is also times he was using a fork to eat chips.  He also sometimes noted to acting as he was eating well there is no food on his table.  No behavioral issues are very pleasant and a mild.      Medications: 2 tablets of Sinemet 25/100 3 times daily.  No side effects or wearing off.  On Sinemet CR 50/200 nightly.  At lower dose does not help with the restlessness and going up and down could not sleep well.  The increase Sinemet dosage to 2 tablets 3 times daily seem to be helping..  No side effects of medications.  No wearing off or any side effects of medications.   On carbidopa levodopa 25/100, 2 tablets 3 times daily, Sinemet CR 50/500 nightly.       Sleep: Has a lot of vivid dreams and some enactment of dreams.  No injuries to patient and partners.  The Sinemet CR 50/200 helped to get more restful sleep in general, at the lower dosage was taking up and moving around a lot.   Add melatonin 3 mg at bedtime for trial.     Psych: OK, fairly happy.     Falls: None     Autonomic: No orthostasis.  But he reports some lightheadedness and dizziness at times with standing position in the summer.  Recommended to increase fluid intake.  Numbness in feet.     Memory: No hallucinations. Usually remembering to take meds.  [place] scored 20/30 on 10/2/2019     Therapy/exercise: Deferred therapy and exercising daily.  Walking dog.           History review:  In 2015 he was noted to have some cognitive and memory difficulty.  He needed some direction to navigate in unfamiliar environment or new locations.  In 2016 noted some imbalance and poor coordination.  Started in 2017 hand tremor started with left side and within 1 year spread to the other side.  His general motor function slowed down including ambulation.  His speech came soft.  Some drooling when he sleeps.  He has had some REM sleep behavior including screaming and daydreaming initially noted in 2013.Hand writing has becoming less legible.     He was started on carbidopa levodopa currently on 25/100, 1 tablet at about 7 AM, noon and 5 PM.  No side effects of medication and no wearing off effect.  In general he felt better with tremor and coordination and motor function little faster.  He is able to put his clothes on easier.  The benefit definitely noticeable.  When he forgets to take his pills his wife would not notice the difference. In 9/2018 we did ON/OFF testing with improvement in his tone and bradykinesia. This makes idiopathic Parkinson Disease a possible diagnosis. However will still have to consider an atypical parkinsonism, perhaps Lewy Body Dementia given his preceding cognitive changes.     For his waking at night will add a CR Sinemet as it may be related to some shoulder stiffness. Will also increase Sinemet dose as he did well on 2 tabs on ON/OFF testing today.        In Dr. Guajardo's note there was some documentation of blood pressure  drop.  However again today's clinical exam he did not demonstrate orthostatic hypotension.  He denies any significant lightheadedness or dizziness feelings.  Does not feel fatigued or tired.  No shoulder pains.  Occasionally he feels tingling in his body.     No falls and does not walk with any assisted device.     Has had therapy and staying very active.     No family history of Parkinson disease. His parents  in the 90s.  Mother had breast cancer and heart disease, his father passed away 6 months later after his mom passed.     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     Workups  MRI of the brain 2017 showing small vessel ischemic changes otherwise unremarkable.        10 point system review otherwise negative          Patient Active Problem List   Diagnosis     HTN (hypertension), benign     Memory difficulty     Parkinson disease (H)             Current Outpatient Medications   Medication Sig Dispense Refill     aspirin 81 mg chewable tablet Chew 81 mg daily.       calcium carbonate-vitamin D3 (CALCIUM 600 WITH VITAMIN D3) 600 mg(1,500mg) -400 unit cap Take by mouth.       carbidopa-levodopa (SINEMET CR)  mg ER tablet Take 1 tablet by mouth at bedtime. 90 tablet 1     carbidopa-levodopa (SINEMET)  mg per tablet Take 1 tablet by mouth 3 (three) times a day. Take 30 minutes before meals (Patient taking differently: Take 2 tablets by mouth 3 (three) times a day. Take 30 minutes before meals      ) 270 tablet 3     magnesium oxide 400 mg cap        multivitamin with minerals (THERA-M) 9 mg iron-400 mcg Tab tablet Take 1 tablet by mouth daily.       No current facility-administered medications for this encounter.        Patient has no known allergies.    No past medical history on file.    Social History     Socioeconomic History     Marital status:      Spouse name: Not on file     Number of children: Not on file     Years of education: Not on file      Highest education level: Not on file   Occupational History     Not on file   Social Needs     Financial resource strain: Not on file     Food insecurity:     Worry: Not on file     Inability: Not on file     Transportation needs:     Medical: Not on file     Non-medical: Not on file   Tobacco Use     Smoking status: Former Smoker     Smokeless tobacco: Never Used   Substance and Sexual Activity     Alcohol use: Yes     Alcohol/week: 1.0 standard drinks     Types: 1 Glasses of wine per week     Drug use: Not on file     Sexual activity: Not on file   Lifestyle     Physical activity:     Days per week: Not on file     Minutes per session: Not on file     Stress: Not on file   Relationships     Social connections:     Talks on phone: Not on file     Gets together: Not on file     Attends Orthodoxy service: Not on file     Active member of club or organization: Not on file     Attends meetings of clubs or organizations: Not on file     Relationship status: Not on file     Intimate partner violence:     Fear of current or ex partner: Not on file     Emotionally abused: Not on file     Physically abused: Not on file     Forced sexual activity: Not on file   Other Topics Concern     Not on file   Social History Narrative     Not on file       Family History   Problem Relation Age of Onset     Dementia Neg Hx      Parkinsonism Neg Hx        Objective:  Vitals:    10/02/19 1032   BP: 149/87   Pulse: 67   Weight: 167 lb (75.8 kg)     Well dressed and groomed.  Atraumatic and normocephalic.  Exam of the head, neck, eyes, ears, nose and mouth negative.      Normal mental status, language slightly soft speech.  No edema or skin rashes.  Mood is fine.  Cognitive compromise.  [place] scored 20/30.     Cranial nerve II to XII significant for little hard of hearing with hearing aids.  Normal muscle bulk and strength in 4 extremities.  Increased muscle tone in general worse on the left side.  Hand coordination slightly compromised  more on the left side.  Slightly wide-based gait but no trouble to get up from the chair.    Some difficulty turning with decreased balance.

## 2021-06-02 VITALS — WEIGHT: 166 LBS

## 2021-06-03 VITALS — WEIGHT: 167 LBS

## 2021-06-11 NOTE — PROGRESS NOTES
NEUROPSYCHOLOGICAL CONSULTATION    NAME:  Gaurav Aldridge  :  1945    CABALLERO: 2017    REASON FOR REFERRAL & BRIEF HISTORY OF PRESENT ILLNESS:  Mr. Gaurav Aldridge is a 71 y.o., right-handed,  male with an approximate three year history of motor slowing, mild upper extremity tremors, stooped posture, shuffling gait and cognitive changes who was referred for a neurocognitive evaluation by Suman Daniels MD to assist with differential diagnosis and care planning.  He was accompanied by his wife who provided the majority of the details of his history and described her current concerns.  In brief Ms. Aldridge reported first noticing that her  was exhibiting diminished stamina and cognitive changes approximately 3 years ago.  She stated that everything started small and has progressed over time.  The medical record indicates that she first noticed him as having difficulties with navigating his car and experiencing memory difficulties, as well as apathy and a right-handed tremor, which is now bilateral.  From a physical perspective there is also a history of reduced stamina, hypersomnolence during the day (napping more than 2 hours a day), orthostasis, probable REM behavior disorder, and a general slowing.  Mor recently, they describe difficulties with his dexterity and a slight clumsiness when using his hands for fine motor tasks (i.e., using a zipper, getting something out of his wallet, etc.).  Emotionally, Ms. Aldridge reports that her  lacks in ambition and motivation for his previously enjoyed activities.  She perceives her  to be more anxious at times and more easily frustrated by some of his physical difficulties.  She went on to note that his anxiety comes on if he is feeling pressured.  He is more social withdrawn.    From a cognitive perspective,  Mr. Aldridge denied cognitive changes in recent years.  Specifically, he denied concerns about his memory or word finding difficulties and stated he  "focuses adequately on reading and watching television.  He did acknowledge that he has never been very organized or one to multi-task.  He does at times feel that he is \"a beat behind\" his peers when they are out for dinner or engaged in a lively conversation.  In contrast, Ms. Aldridge stated that one of the first symptoms she noticed was a change in her 's memory.  She has recently begun to question if he is actually experiencing memory difficulties, noting that at times it feels like there is \"no issue at all.\"  There are times and it seems that his memory is normal and other times where he is a little bit more disoriented, generally at the end of the day or when he is fatigued.  Mrs. Aldridge spent 11 years caring for her mother had Alzheimer's disease and noted that she believes that her  exhibits sundowning which causes more confusion at the end of the day.  She notes that he will occasionally exhibit semantic paraphasic errors but that his speech is otherwise fluent.  She finds that he can focus adequately, but has never been much of a multitasker. She agrees that he has slowed down somewhat with regard to his speed of processing.  She finds that this is most notable when they are playing cards and he struggles to count his points as fluidly and efficiently as he has in the past.    With regard to the activities of daily living, Mr. Aldridge reported that he is generally independent.  He and his wife live in their home in [place], [place] and also have a place in [place] where they spend the [place].  He is not taking any prescription medications and remembers to take his supplements and vitamins on a daily basis for the most part.  He has never been involved with managing their finances.  He used to be quite the lloyd and has been less interested in gardening in recent months.  He stated that he does \"small things\" around the house such as vacuuming and cutting the grass but that he requires more " "frequent breaks due to fatigue.  When asked about projects around the house, he indicated that he is more inclined to hire professional now than in the past.  He will make lunch for himself daily but is less interested in grilling and preparing meals.  He continues to drive but has to focus on the task at hand.  There is no history of recent accidents or getting lost or turned around when driving.    MEDICAL HISTORY:  Mr. Aldridge's medical history is significant for possible concussion injuries in football in high school (no loss of consciousness or persistent symptoms).  Mr. Aldridge denied history of chronic medical conditions with the exception of hearing loss for which he received hearing aids approximately 1 year ago. He has undergone bilateral cataract surgery in 2015/16.  There is a history of cholecystectomy and appendectomy in 1990.  A recent swallow study (6/28/17) revealed \"no oral and mild pharyngeal dysphagia.\" There is no history of heart attack, stroke or recent falls.    Current medications include (per medical record):   Current Outpatient Prescriptions:      aspirin 81 mg chewable tablet, Chew 81 mg daily., Disp: , Rfl:      calcium carbonate-vitamin D3 (CALCIUM 600 WITH VITAMIN D3) 600 mg(1,500mg) -400 unit cap, Take by mouth., Disp: , Rfl:      magnesium hydroxide (MILK OF MAG) 400 mg/5 mL Susp suspension, Take 30 mL by mouth daily as needed., Disp: , Rfl:      multivitamin with minerals (THERA-M) 9 mg iron-400 mcg Tab tablet, Take 1 tablet by mouth daily., Disp: , Rfl: .    FAMILY MEDICAL HISTORY:   Family medical history is significant for:  Heart disease (mother), breast cancer (mother), skin cancer (mother), possible pulmonary embolism (father), diabetes (sister), Alzheimer's disease (uncle).  The patient denied any family history of psychiatric illness or chemical dependency issues.  There is no known family history of other neurological condition or Parkinson's disease.    PSYCHIATRIC HISTORY:  With " regard to his psychiatric history, Mr. Aldridge denied a history of past psychiatric conditions or mental health treatment.  At the current time, he described his mood as pretty good.  Ms. Aldridge noted that he has been very consistent with regard to his mood and easy going for most of his life, and remains that way, but appears slightly more reactive at times.  His wife has expressed concern about Mr. Mcgregor's  increased anxiety, which has been present for the last 1-2 years, particularly when he becomes frustrated with his physical limitations or when he is under pressure.   He is less interested in gardening and engaging in hobbies, but does continue to golf when they are in [place].  He is a bit more socially withdrawn, which has been noted by the couples' friends.  He did endorse a history of visual hallucinations, misperceptions of objects at home (momentary) as well as seeing things moving in the shadows.  He also endorsed passage phenomenon.  With regard to the vegetative symptoms of depression, he reported that his appetite is adequate.  At the present time, he reported that he sleeps from 10 PM to AM and awakens in the night to go to the restroom. There is a history of dream enactment behaviors as well as excessive daytime somnolence. Specifically, Mr. Aldridge can be known to doze off multiple times during the day for cat naps and then for longer spells in the late afternoon, prior to going to bed for the night.  He is sleeping for >2 hours per day.   He is fatigued during the day and has diminished energy for his normal activities.    With regard to substance abuse, Mr. Aldridge reported no history of past drug or alcohol abuse or dependence.  At the present time, he reported that he typically consumes 1 cocktail per evening.  He has been a non-smoker for 40+ years (cigarettes), but will occasionally smoke a cigar. There is no significant history of drug use or chemical dependency treatment.    SOCIAL HISTORY:  Mr. Aldridge was  born and raised in [place], [place] and was one of five children.  He described himself as a good student. He denied a history of early learning difficulties, individualized instruction, or grade repetition.  After graduating from high school, he went on to complete one semester at [employer] before enlisting in the [employer].  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  He is currently retired.  At the present time, he is currently  and lives with his wife of 28 years.  They have two children.      SERVICES:   Pertinent information was obtained by reviewing the electronic medical record as well as through an individual interview I conducted with the patient.  I selected, integrated and interpreted the tests and generated this report.  A trained examiner administered and scored the neuropsychometric tests. For diagnostic and coding purposes, Mr. Aldridge has a history of parkinsonism and was referred for an evaluation of mild neurocognitive disorder. Today s evaluation consisted of 1 units of 34072,  3 units of 65562, and 3 units of 50331.     TESTS ADMINISTERED:   Wechsler Adult Intelligence Scale-IV (select subtests), Wide Range Achievement Test-4 (select subtests), Wechsler Memory Test-IV (select subtests), [place] Verbal Learning Test-II, Trailmaking Test,    FAS and Animal Fluency, [place] Naming Test-2, Dong-Osterrieth Complex Figure Test, Stroop Color and Word Test, [place] Card Sorting Test-1 deck, Duval Judgement of Line Orientation, Finger Tapping Test and Grooved Pegboard, Geriatric Depression Scale-Short Form.    BEHAVIORAL OBSERVATIONS:   Mr. Aldridge arrived on time and accompanied by his wife to today's appointment. He was appropriately dressed and groomed.  He appeared alert and engaged.  His mood was euthymic and his affect was restricted.  He exhibited a masked face.  Rapport was easily established and eye contact was unremarkable.  He  was pleasant and cooperative. Rate, prosody, and content of speech were grossly normal.  He exhibited diminished insight into his current symptoms and often relied on his wife to share her concerns as well as the details of his history.  There was no evidence of a kiersten thought disorder; no hallucinations or delusions were apparent.     Mr. Aldridge appeared adequately motivated and engaged easily in the testing component of the evaluation.  His performance was fully intact on measures of objective effort.  He was alert and engaged.  He attempted all tasks presented to him and worked at a steady pace.  He did not appear overly frustrated by difficult or challenging tasks and responded appropriately to encouragement from the examiner.  No significant barriers to testing were observed and the following is judged to be a valid representation of his current neurocognitive strengths and weaknesses.    OPTIMAL PREMORBID INTELLECT:  Optimal premorbid intellectual abilities were estimated as falling in the average range based on Mr. Aldridge's educational and occupational histories and performance on tasks least likely to be affected by acquired brain dysfunction (i.e.,  hold tests ).    TEST RESULTS:    Sensory testing was not performed, in favor of cognitive tasks.  Fine motor speed, as measured by the finger tapping test fell in the moderately to severely impaired range in his dominant (right) hand and in the severely impaired range in his nondominant (left) hand.  Speeded motor dexterity, as measured by the grooved pegboard test, fell in the severely impaired range bilaterally.  Typically this test was discontinued after 3+ minutes when he was able to place only 16 pegs with his dominant hand and 13 pegs with his nondominant hand.    Auditory attention and working memory performances were average.  Specifically, Mr. Aldridge was able to immediately recall up to 7 digits presented auditorily (high average), mentally reverse up to 3  digits (borderline impaired) and numerically sequence up to 6 digits (average).  His performance was average when he was asked to solve mental arithmetic word problems are presented auditorily.    Cognitive speed and processing accuracy performances were low average across times measures of visual scanning, matching, and decoding.  His performance is consistently low average across speed measures of word reading, color naming, and response inhibition.  His performance was low average on a speeded measure of visual scanning numeric sequencing but declined to the severely impaired range on the subsequent measure that required him to use mental flexibility and set shifting to alternate between sequencing letters and numbers presented on the page.  Of note, the test took nearly 5 minutes to complete and he made 4 errors.    Comprehension was fully intact.  His performance was consistently average across measures of acquired verbal knowledge and verbal abstraction.  Confrontation naming was average.  His performance was low average and a measure phonemic verbal fluency and mildly to moderately slowed on a measure of semantic verbal fluency.    Visual reasoning abilities were average under times untimed conditions.  His performance was low average and a measure of spatial localization that required him to  the angles which a series of lines were presented in space.  His performance was low average on a measure three-dimensional visual construction required him to re-create a series of 2-dimensional designs via 3-dimensional block arrays.  He struggled considerably on a measure of visual planning, organization, and constructional skills that required him to copy a complex visual stimulus.  In his design, he employed a extremely piecemeal approach and worked from right to left.  While he struggled considerably to integrate the elements of the design and there was some degree of perseveration and inaccurate placement of  the details, the overall Gestalt was broadly intact.  When compared to his peers, his performance fell in the borderline to mildly impaired range on the measure.    With regard to learning memory, Mr. Aldridge exhibited low average initial acquisition of the series of details presented auditorily in a short story format.  He retain recalled only 40% of those details over a long delay (mildly impaired) but his recognition memory performance improved to the average range.  In contrast, he exhibited a moderately impaired rate of initial learning of a series of 16 words over 5 learning trials (raw score recall = 3, 4, 6, 6, 5).  He retained recalled all 6 of the previously learned words over a immediate delay and 5 of the 6 over long delay (low average).  His performance improved slightly with cueing but more significantly when he was presented with a recognition test format.  He accurately recognized 13 of the target words and made a slightly elevated number of false positive errors  (8, one standard deviation above the mean).  His immediate recall the series of visual details presented over 10 seconds learning intervals was borderline impaired and he retained recalled 59% of the previously learned visual information over a delay (low average).  His recognition memory performance was also in the low average range.    Mr. Aldridge denied clinically significant symptoms on a self-report measure of depression, noting only that he does not currently feel full of energy (GDS-15 = 1).    SUMMARY:  Mr. Gaurav Aldridge is a 71 y.o., right-handed,  male with an approximate three year history of motor slowing, mild upper extremity tremors, stooped posture, shuffling gait and cognitive changes who was referred for a neurocognitive evaluation by Suman Daniels MD to assist with differential diagnosis and care planning.  Optimal premorbid intellectual abilities were estimated as falling in the average range and Mr. Aldridge's performance was  intact and commensurate with that estimate across measures of verbal and visual reasoning abilities, auditory attention and working memory, and confrontation naming.  Low average performances were seen across measures of basic cognitive processing speed and his performance declined to the moderately to severely impaired range on a measure of processing speed that also required divided attention and mental flexibility and set shifting.  Phonemic verbal fluency was low average and his performance declined to the mild to moderately impaired range on a measure of semantic verbal fluency.  He exhibited low average visual spatial appreciation and his performance fell in the mildly impaired range on a task that required him to copy a three-dimensional design.  His approach this task suggested difficulties with planning as well as a degree of constructional apraxia. He was moderately perseverative on a measure of nonverbal problem solving and achieved a reduced number of solutions on the task.  Motor slowing was severe bilaterally, (L >R), with even a greater degree of difficulty noted on a measure of speeded dexterity.  With regard to learning and memory performances, Mr. Aldridge exhibited mildly to moderately reduced new learning of a series of words presented over five learning trials but retained and recalled 80% of the previously learned information over a delay and his recognition memory performance improved further.  New learning of contextual verbal information was low average, with 40% retention over a delay (mildly impaired) and average recognition memory performance.  Visual immediate learning was borderline impaired, with 59% retention (average) and low average recognition. Mr. Aldridge denied depressive symptoms on a self-report measure.    DIAGNOSTIC IMPRESSIONS & RECOMMENDATIONS:   Overall, the results of today's evaluation are significant for the presence of mildly slowed basic processing speed, visuospatial  perception, moderately impaired mental flexibility and set-shifting, semantic verbal fluency, and visual planning and constructional skills in the context of otherwise average abilities.  While contextual verbal and visual learning abilities were lower average, Mr. Aldridge struggled considerably on a measure of rote auditory verbal learning (moderately impaired).  Delayed memory performances were variable, ranging from 40% retention for verbal material presented only once, to 80% retention for information presented repeatedly over five learning trials. Delayed visual memory abilities were low average, with 59% retention over a delay.  Recognition memory performances were slightly variable as well, but Mr. Ojedas performance did benefit when he was presented with a recognition test format.  At this point in time, Mr. Ojedas neurocognitive profile is consistent with a diagnosis of mild neurocognitive disorder with cognitive dysfunction noted in multiple domains.  Specifically, Mr. Aldridge exhibits mild declines in visuospatial perception and mild to moderately reduced semantic verbal fluency and executive functioning (i.e., constructional abilities, mental flexibility and problem solving). Furthermore, his pattern of performance across memory testing was consistent with a subcortical profile, such that his learning was facilitated when information was presented in a logical and organized fashion, and delayed memory performances were variable across tasks/domains with notable benefit when he was presented with a recognition test format for the previously learned information.  Mr. Aldridge's current neurocognitive profile shows the characteristic pattern of mild frontal-subcortical dysfunction that is typically observed in Parkinson's disease.  While Mr. Aldridge's clinical presentation has raised concern about the presence of anosognosia and an atypical Parkinsonian syndrome, we do not yet see evidence to support a Parkinson's plus  diagnosis based on his cognitive data alone. Certainly his lack of insight into this cognitive difficulties is greater than what is typically seen in Parkinson's disease and he exhibits a notable degree of autonomic dysfunction which suggest that other etiologies cannot be fully ruled out at this point in time.  Given his constellation of symptoms, he will need to be seen by me again in approximately one year so that we can track any cognitive changes over time.      In the interim, Mr. Aldridge will benefit from having his wife oversee his use of any medications or dealings with family finances. Cognitive speed is slowed, particularly in circumstances where his attention is divided, which raises significant concerns regarding his safety to continue driving.  Problem solving skills are below expectation and he struggles to modify his approach to a task when he encounters a barrier; as such, he would benefit from continuing to seek outside assistance when faced with home repair or maintenance issues.  Given his recent decline in motivation and drive and tendency to spend several hours per day resting/napping, Mr. Aldridge and his wife are encouraged to continue to actively seek out and schedule time to engage in social and other pleasurable activities.   Consideration could be given to initiating a medication to address Mr. Aldridge's anhedonia, apathy, and anxiety (per his wife's report). Participation in a support group offered through [facility] is also recommended.      Mr. Aldridge has requested to receive the results of this evaluation from his referring provider at the time of an upcoming follow-up appointment; however, he was encouraged to schedule a formal feedback appointment with me after that appointment to discuss these results in further detail.     Thank you for allowing me to participate in Mr. Aldridge's care.  Please contact me with any questions regarding the content of this report.      Keyla Cantu, PhD, ,  ABPP  Clinical Neuropsychologist, AUBREY#[id number]  Board Certified in Clinical Neuropsychology    [facility]  Neuropsychology Section   Phone:  [phone]

## 2021-06-11 NOTE — PROGRESS NOTES
Physical Therapy  Physical Therapy  Movement Disorders  Medicare Certification    Medical Diagnosis: Parkinsonism    Treatment Diagnosis: Gait Instability, Bradykinesia   Referring MD: Dr. Suman Daniels  Onset Date: 6/26/2017  Start of Care: 6/26/2017    Assessment: Pt is a 70 yo male with a recent diagnosis of Parkinsonism who presents to the PT evaluation with the chief complaint of fatigue/decreased stamina and his wife noting that his walking and posture have begun to deteriorate.  PT eval reveals that pt is at an elevated fall risk as measured by his miniBEST score of 21/28 and is well below age/gender norms with his gait speed and 6MWT due to being bradykinetic.  Patient would benefit from skilled 1:1 PT to address deficits and optimize function due to progressive nature of disease and to decrease falls risk.     Prognostic Indicators: Rehabilitation Potential Good  Impairment: Acitivity Tolerance, Balance, Bradykinesia/Hypokinesia, Motor Function, Motor Planning/Coordination, Postural/Gait Instability, Sensory Function and Gait    Functional Goals to be met by 9 visits (including evaluation  Patient will show improved efficiency and quality of movement, improved gait and postural stability for increased safety, decreased fall risk when performing ADL's, IADL's, household &/or community ambulation as measured by:      1.  Cog TUG < 10 seconds    2.  Balance Assessment: 25/28 on the miniBEST   3.  Gait speed:  1.18 m/second and # steps in 6 meters 10 steps    4.  Patient will ambulate > 1830 feet in 6 minute with RPD< 4/10 to indicate improved functional activity tolerance for participating in social events &/or household/community ambulation.  5.  Patient will be mod I/SBA with HEP  Patient Functional Goal: Pt would like to improve his stamina so that he can assist with HH cleaning  Goals were established with the patient: yes    Plan of Care  Communication with: referral Source, patient and caregiver, Patient /  Family / Instruction: Etiology of diagnosis or presented symtoms, Treatment plan/rationale, Home Exercise Program and Expected Functional Outcomes, Big/PWR Techniques, Therapeutic Exercise, Mobility / Transfer Training, Gait Training, Neuro Re-ed / Balance, Compensatory Strategies, Neuromuscular reeducation and Stair training    Frequency/Duration 2x/week for 4 weeks.  Up to 9 visits    Yuki Watters, PT, DPT, MTC, NCS    MEDICARE PATIENTS:  [id number]  Provider #   Certification Dates: from 7/6/2017 to 10/3/2017        Physician Recommendation:  1. I certify the need for these services furnished within this plan and while under my care. I agree with the therapist's recommendation for plan of care.    2. If there is any recommendation for modification of therapy plan, please indicate below.      Physician's Signature (Printed):  _____________________________

## 2021-06-11 NOTE — PROGRESS NOTES
"[facility] Outpatient Consultation    Assessment / Impression:   1.  Cognitive disorder NOS with prominent frontal subcortical findings including difficulty with attention including working memory, cognitive set shifting, cognitive processing speed  2.  Parkinsonism, rule out Parkinson's disease, rule out atypical parkinsonian syndrome  3.  Probable REM behavior disturbance  4.  Dysosmia/anosmia  5.  Autonomic insufficiency as manifested by orthostasis, fatigue, glare intolerance, erectile dysfunction 1  6.  Status post cholecystectomy 1990, appendectomy 1940 and bilateral cataract extractions 2015 and 2016    Plan:   1.  Noncontrast MR brain  2.  Neuropsychometric testing  3.  Screening blood work  4.  Video swallow  5.  PT referral    This 71-year-old male with essentially unremarkable past medical history has developed evidence of a subcortical process following several years of nocturnal behavior suggestive of a REM behavior disturbance.  From a cognitive perspective, the spouse notes significant difficulty with attention, planning and problem solving and memory.  Motorically, the spouse as noted the patient did be experiencing generalized slowing, bilateral upper extremity tremors (mild) and a change in posture in that she states that he now \"stands like a Kangaroo.\"  On exam, the patient does demonstrate ample evidence of a subcortical syndrome as noted below.  I also note a 32 mm drop in systolic blood pressure sitting to standing.  Although the differential remains reasonably broad at this time, the history and exam is supportive of a somewhat atypical parkinsonian syndrome and thus the rationale for the above-noted evaluation.  I explained my findings to the patient and spouse and I have answered all their questions.    Total time for this evaluation one hour with the majority time spent in counseling.  All questions answered.    Subjective:   HPI: Gaurav Aldridge is a 71 y.o. male with above-noted past " "medical history who presents for evaluation of a number of problems.  Accompanied by his wife of 48 years, Nadira reports her  to a been at his baseline state of health until approximately 3 years ago.  At the onset of illness, the spouse notes that she observed her  to be having difficulty navigating while driving his car.  Always quite independent with respect to traveling in the car, Nadira noted her  to be relying more and her to assist in navigation eventually even to familiar locations.  Although the spouse has difficulty with the timing of onset of symptoms, she notes that shortly after noticing difficulty with navigation, she noted her  to be becoming a bit more apathetic, to be having difficulty with ambulation and to develop a right hand tremor.  Short-term memory impairments have only been present for the past 2 years with difficulty in card playing was noted possibly somewhat earlier.  Eventually, bilateral hand tremors were noted at rest.  In addition postural changes were noted including a stooped posture and flexion of the upper extremities bilaterally in a manner that the wife describes as similar to the posture of the Kangaroo.  Nadira notes that friends began commenting on Gaurav's appearance and that he seemed to become \"an old man\" very rapidly.  Of interest, the patient apparently has a history of acting out his dreams at night for \"many years\" although this problem appears to be becoming less prominent more recently.  In addition, the patient and spouse note that the patient has had difficulty with olfaction for years.  Erectile dysfunction has also been noted for quite some time.  Nadira reports that her  seems to lose motivation more prominently towards the end of the day but she is uncertain as to whether he might be experiencing confusion.  Increasing levels of anxiety have been noted in new and demanding situations this problem becoming more prominent over the " past 1-2 years..  Spouse notes that these difficulties have been progressing in a slow but steady manner since their onset.    The patient presents as a fairly well-developed but somewhat underweight elderly male who appears in no acute physical distress.  Immediate evidence of an extraparametal disorder was noted.  The patient was noted to sit with a forward flexed posture and to demonstrate a forward flexed posture when standing to greet this examiner.  In addition, I noted what appeared to be some slowness of cognitive processing, softening of the voice and some facial masking.  Reporting being aware of having difficulty with short-term memory, the patient otherwise denies other cognitive or motoric changes.  He states without hesitation that he believes he retains the ability to live independently (an idea corroborated by the wife).  On further review of system questions the patient does confirm difficulty with bright light tolerance, orthostasis, erectile dysfunction, fatigue dysosmia/anosmia.  He does report presence phenomena as well as visual illusions but not hallucinations.  He has experienced a 67 pound weight loss over the past year and believes that he is experiencing an increased degree of weakness.    The patient specifically denies difficulty looking down, neck stiffness, difficulty with coordination of the limbs.  There have been no repeated falls, loss of consciousness.  He denies trouble with vision including difficulty with literacy.  He denies symptoms of depression although the spouse is somewhat concerned about the possibility of depression, I believe mostly because of difficulty the patient has been experiencing with motivation.    Past Medical History:   As above    Social History:   Born in [place], [place], the patient completed high school education.  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   for 48 years, both he and his wife raised 2  children.  The patient and spouse occupy a home that they have owned in [place] for more than 27 years.    Past Psychiatric History:   The patient denies any history of psychiatric illness.  There is no history of chemical dependency or substance abuse.    Family History:   Mostly unremarkable although the patient does report having an uncle who suffer from late in life dementia.    Review of Systems:  As noted above.  Otherwise, the patient denies headaches, shortness of breath chest discomfort abdominal pain fevers chills sweats.  Again a modest degree of weight loss has been noted over the past year .  In addition, the patient has been having increasing difficulty with choking while drinking fluids and consuming solid foods.  Also, the spouse reports snoring but she is uncertain as to whether there are periods of apnea during sleep.  Objective:   As above    Vitals:    06/26/17 0827 06/26/17 0828 06/26/17 0829   BP: (!) 181/77 171/77 149/69   Pulse: (!) 58 65 70   Weight: 163 lb (73.9 kg)       Physical Exam:    Skin examination reveals no evidence of rashes or lesions although I do question a mild amount of facial seborrhea.  HEENT examination is grossly unremarkable eyes with conjunctiva clear.  The patient is wearing hearing aids bilaterally.  Oropharynx is clear with no retained oral secretions or lesions.  Tongue is midline.  Neck is without adenopathy.  No tenderness.  Lungs are clear to auscultation with normal work of breathing.  Cardiac exam with a regular S1-S2 without third or fourth heart tones.  Neck veins are flat.  Abdomen is flat.  Bowel sounds are noted.  Extremities are without significant edema.  Proximal limb musculature appears to be a bit atrophic.  Peripheral joint changes consistent with degenerative joint disease noted with no joint redness swelling or tenderness.    Neurological Exam:     Cranial nerve examination is remarkable for mild hypomimia.  Eyes demonstrate conjugate gaze with  physiologic anisocoria.  The patient does appear to have some difficulty with accommodation with the development of double vision.  Pursuit movements appear to be within normal limits with no evidence of square wave jerks.  I did not note ocular apraxia or ataxia.  Some diminution of saccadic amplitude was noted.  Volitional EOMs reveal restriction in upgaze.  No evidence of difficulty with visual fields.  A slight degree of facial asymmetry is noted with no impairment in facial sensation.  Hearing is impaired bilaterally the patient is wearing hearing aids.  Speech is reasonably well articulated with possibly some mild difficulty with articulation.  Vocal projection is noted to be reduced.  No clear evidence of difficulty with swallowing at this time.  Shoulder girdle strength would appear to be reasonably intact.  Strength is noted to be a bit reduced in the proximal limb musculature.  Increased tone is noted bilaterally with some mild cogwheel rigidity noted in the right upper extremity.  The patient did appear to have some difficulty with fine motor dexterity of the hands bilaterally without laterality.  No evidence of limb apraxia clearly identified.  No tremors were observed at this time.  Reflexes appear to be symmetrical with slight increase in lower extremity reflexes compared with upper extremity reflexes.  Frontal release signs include a glabellar, snout and bilateral palmomental.  Station demonstrates this to posture with flexion at the elbows and wrists.  Postural reflexes are diminished.  A degree of retropulsion is present.  Romberg is negative.  Gait demonstrates a short stride with marginal ground clearance.  Associated arm movements are reduced but bilateral.  Turns demonstrate no evidence of focal weakness or ataxia.  Tandem gait without difficulty.  No evidence of gait apraxia or gait ataxia.  No freezing of gait.  Sensation does appear to be intact to light touch.        Cognitive Evaluation:      The patient was neatly groomed casually dressed and seated for evaluation.  I noted him to be alert and attentive during the course of conversation but a bit less spontaneous than what would be considered normal.  Again, as noted above, some masking of the face along with depressed vocal projection noted.  Thought content appeared to be a bit reduced and the patient appeared to have difficulty generating ideas.  Speech for the most part was fluent with no word finding hesitations or evidence of motor speech problems.  Attentional function was reduced.  The patient had difficulty with cognitive set shifting as well as working memory tasks.  Perseveration was noted on the loop test.  The patient did not demonstrate distractibility nor did I note fluctuation in level of consciousness.  Short-term memory did appear to be a bit impaired.  The patient has difficulty with constructions.  Perseveration was noted on the loop test.  Some difficulty with cognitive set shifting noted on a graphomotor sequencing test.  Mood appeared to be good at this time.  I noted no abnormal thought content or form other than that noted above.  No evidence of significant anxiety at this time including true obsessions and compulsions.  No stereotypies noted.  No bizarre or unusual behaviors.    Medications:  Scheduled Meds:  Continuous Infusions:  PRN Meds:.    Suman Daniels MD  6/26/2017

## 2021-06-11 NOTE — PROGRESS NOTES
Persons accompanying you (the patient) today: Wife: Nadira     How have you been doing since we saw you last? Please note any concerns.  People is new they will speak with a SW.     Please list any surgeries or procedures you have had since we saw you last:  n/a    Have you had any falls since your last visit? No    Do you have any pain today? No    With whom do you currently reside? (alone, spouse, family, assisted living, nursing home)  Pt live with her spouse in a single family home, however they will be moving into a town house soon,

## 2021-06-11 NOTE — PROGRESS NOTES
.OUT PATIENT CLINICAL SOCIAL WORK: PSYCHOSOCIAL ASSESSMENT      Gaurav Aldridge   1945 6/26/2017    Primary Language: English  Referral Source: Self Referral- Alzheimer's association website  Person(s) Present at Visit: Nadira (Wife) and Patient  Goal(s) for Visit: First Consultation  Presenting Concerns:Short term memory loss.   Cognitive: Patient reports losing interest in activities.  Wife reports she has to remind patient multiple times of appointments, patient is much more reserved/ quiet, and patient is not able to keep up as well with cards/ numbers & math/ tinkering with small mechanics/ machines.  Behavioral: Patient reports loss of intetest in things that he once enjoyed (i.e. Gardening).  Wife reports loss of interest in hobbies/ activities, and decrease in socialization/ social anxiety.      Gaurav Aldridge is a 71 y.o. male with memory issues NOS.  Patient presented in clinic today with wife, Nadira, accompanying him.  Patient was relatively quiet during the assessment, and wife answered most questions on his behalf.  Patient was well dressed & groomed and sat quietly with an appropriate affect and engagement in the assessment process.        PRIMARY DECISION MAKER FOR HEALTHCARE  Patient  Copy of legal documents on chart? No - Requested copy from: Patient & Wife      PATIENT REPRESENTATIVE  Same as above      HEALTHCARE DIRECTIVE/LEGAL ISSUES  Pt has healthcare directive: Yes        If yes, copy of documents on chart? 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 MCNULTY for complete medical history.  Community MD/Clinic: [facility]       Additional Information/Concerns: Wife also notes issues with hand tremors, shuffling, coughing when eating, muscle loss.      CHEMICAL HEALTH:  Current Tobacco Use: None  Prior Tobacco Use: None       Current Alcohol/Drug Use: None        Treatment: None  Prior Alcohol/Drug Use: None       Treatment: None  Additional Information/Concerns:  None    PSYCHIATRIC / BEHAVIORAL:  Current Dx: None  Current Treatment: none  Prior Dx: None  Prior treatment: none  Additional Information/Concerns: Wife states she is concerned about possible depression.     HOME / LIVING ENVIRONMENT:  Patient lives: With SO/Family  Home Accessibility Concerns: N/A  Additional Information/Concerns:       FAMILY SUPPORT:  Relationship Status:   Children: 2      DAILY ROUTINE:  Sleep-Notes nightmares   Nutrition-decrease in appetite and some weight loss ~ 6 lbs  Activities-loss of interest/ fatigue   Hobbies-gardening  Additional Information/Concerns: patient and wife are concerned about loss of interest in activities and constant fatigue.      FUNCTIONAL STATUS:  Is patient driving? Yes   Additional Information:   Is patient independent with the following activities? Bathing, Dressing, Grooming, Toileting, Eating, Mobility, Home Making, Medication Management, Meal Preparation, Shopping and Financial Management  Additional Information/Concerns: Wife states that he is fuctional with all his ADL's, however he needs reminders of upcoming appointments.       PRIOR COGNITIVE TESTING / IMAGING: N/A    INTERVENTION(S):  Assessment & resources provided; Patient was given informational packet for Memory Loss resource.   Additional Information: N/A    PATIENT/FAMILY OBSERVATIONS:  Patient: Patient states marked decrease in energy, constant fatigue and loss of interest in doing things.   Family/Caregiver:  "Wife notes the above in addition to issues with short term memory (i.e. Cannot remember appointments), walking differently (change in posture and shuffling), and \"sundowning\" in the late afternoon which she described as him being man and fatigued from 2-4pm.     PLAN/FOLLOW-UP: Patient will follow up with social work as needed.       BALDOMERO Moran, Master's Level Social Work Intern  06/26/2017  09:15AM        I have read, discussed and agree with the documentation as presented by Padma Reyes.  Vivian Mckeon, [facility]  06/26/2017              "

## 2021-06-11 NOTE — PROGRESS NOTES
"Speech Language/Pathology  Videofluoroscopic Swallow Study       Problem:  There is no problem list on file for this patient.      Onset date: 6/26/17  Reason for evaluation: referral from Dr. Daniels as part of his diagnostic battery.  Pertinent History: related to swallow, pt wife reports pt with increased coughing at meals. Per progress note by Dr. Daniels dated 6/26/17: This 71-year-old male with essentially unremarkable past medical history has developed evidence of a subcortical process following several years of nocturnal behavior suggestive of a REM behavior disturbance.  From a cognitive perspective, the spouse notes significant difficulty with attention, planning and problem solving and memory.  Motorically, the spouse as noted the patient did be experiencing generalized slowing, bilateral upper extremity tremors (mild) and a change in posture in that she states that he now \"stands like a Kangaroo.\"  On exam, the patient does demonstrate ample evidence of a subcortical syndrome as noted below.  I also note a 32 mm drop in systolic blood pressure sitting to standing.  Although the differential remains reasonably broad at this time, the history and exam is supportive of a somewhat atypical parkinsonian syndrome and thus the rationale for the above-noted evaluation.  Current Diet: regular  Baseline Diet: regular    Patient presents as alert and cooperative during this evaluation.   An  was not applicable    Patient was given puree, honey, nectar and thin.    Oral Phase:    Dentition/Oral hygiene: intact    Bolus prep and oral control was not impaired.     Anterior-Posterior transit was not impaired.    Premature spillage did not occur with all textures trialed.    Tongue base retraction was not impaired.    Oral stasis did not occur with all textures trialed.    Pharyngeal Phase:    Trace amount of aspiration occurred with thin. Patient had no response with aspiration. With use of small sip/chin tuck " strategy, no aspiration occurred.    Laryngeal penetration occurred with nectar thick and thin. Nectar was transient and thin was inconsistent with transient and deep.    Swallow response was mildly delayed with thin. Pourover did not occur with all textures trialed.     Epiglottic movement was complete consistently across texture trials.    Pharyngeal stasis did not occur with all textures trialed.     Pharyngeal constriction was not impaired.    Hyolaryngeal elevation was not impaired. Hyolaryngeal excursion was not impaired.    Cricopharyngeal function was not impaired. Cervical esophageal function was not impaired.    Assessment:    Patient demonstrated no oral and mild pharyngeal dysphagia.    Patient is at minimal aspiration risk with thin liquids.    Rehab potential is good based on evaluation results and motivation and cooperation.    Recommendations:    Plan: Regular and thin liquids    Strategies: small sip chin tuck. SLP spent 30 minutes with pt and his wife in the consult room and explained the results and recommendations of small sip/chin tuck strategy. They were given written information re: this compensatory strategy. They verbalized understanding and pt was able to demonstrate understanding.    Speech therapy is not recommended at this time     Reviewed history of swallow problem with patient, verbally explained role of SLP and radiologist.  Verbally explained process of VFSS prior to administration of barium.  Verbally explained results and recommendations to patient. SLP answered questions and stated that a copy of test will be faxed to ordering physician or sent via [employer].  Patient verbalized understanding.    Referrals: N/A    50 dysphagia minutes    Gloria Braswell MS, CCC-SLP

## 2021-06-11 NOTE — PROGRESS NOTES
"Physical Therapy  PHYSICAL THERAPY  MOVEMENT DISORDER:  INITIAL EVALUATION    SUBJECTIVE INFORMATION    Diagnosis: Parkinsonism  Date of diagnosis: 6/26/2017    Date of last Neurologist visit: 6/26/2017 with Dr. Daniels  Treatment Diagnosis: Gait instability  Precautions/pmh: Insignificant  First movement disorder symptom presentation: Difficulty navigating car, hand tremors  Date: 3-2 years ago  Non-motor symptoms: Loss of smell, Visual impairments, Pain, Early mild cognitive impairment, Dementia, Orthostatic hypotension, Urinary symptoms, Constipation, Sleep disturbances, Fatigue and Restless Leg Syndrome    Time of Evaluation: 1500       Time of last PD med: Not on any       Time of next PD med: Not on any  On/Off presentation/symptoms: NA  History of PD specific PT? No    Pain: No    Living Situation:Home.  2 steps to enter.  2 story home with finished lower level.  Everything pt needs is on the first level.  Pt denies any difficulty with stairs.  Pt is currently building a [employer] with a walk out basement.  Caregiver Support: Lives with spouse, Nadira.  Nadira is at the [facility].  Pt also has a daughter in [place].  Equipment: No AD or grab bars in home.  Current Activity Level: Pt walks about 2 miles 5x/week.  Pt golfs 3x/wk in [place] during the winter.   Chief Mobility Complaint: \"Not a whole lot.  I'm more tired than I used to be.\" Wife notices that his posture is not the same, he shuffles his gait and has minimal arm swing.  Pt's wife notes that dancing is very difficult for the pt, \"He doesn't have the rhythm like he used to. He can't follow or remember steps and his execution isn't great.\"  Pt's wife also mentions that the dexterity with his hands isn't great anymore.  Patient's Functional Goal: Pt would like to improve his stamina so that he can assist with HH cleaning.    Fall history:None    Freezing: Never      OBJECTIVE INFORMATION    Cognition: comments: See Dr. Daniels's note    Bradykinesia and Hypokinesia " "(Combining slowness, hesitency, decreased arm swing, small amplitude and poverty of movement in general):  Minimal    Dyskinesia:No    Posture: Slightly slumped, forward flexed when sitting.     Blood Pressure:  Seated:  139/80, HR 70; Standing 140/73, HR 72    Postural Stability: Posterior tug test (Response to sudden posterior displacement produced by pull on shoulders while patient is erect, with eyes open and feet slightly apart.  Patient is prepared.)  Normal    Transitional Movements  Sit?Stand:   Independent       Sit? Supine:   Independent  360 degree turn:  6-8 steps    TUG(Timed Up and Go): 8.35 seconds (8.5 sec, 8.21 sec)    (>13 sec:  Falls Risk)      Divided Attention   Cognitive TUG:         10.75 seconds Task:  70 to 59 by 3's; miscounted at 59, \"I lost my track.\"       Strength   30 Second Chair Stand:  # 19  Without UE assist Age/Gender Norm:12       Balance      Left LE Right LE     Single Leg Stance (sec) 12.08 4.97         Balance Assessment: Mini-BEST 21/28 (<23/28)     Balance Assessment: Mini-BEST 21/28 (<23/28)   1) Sit <> Stand: (2) Normal   2) Rise to Toes: (1) Moderate, minimal raise to toes   3) Stand on One Leg: (1) Moderate L: 3.09 sec, 12.08 sec R: 4.97 sec, 3.79 sec   4) Compensatory Stepping Correction-Forward: (2) Normal   5) Compensatory Stepping Correction-Backward: (2) Normal   6) Compensatory Stepping Correction-Lateral: (2) Normal L: (2) Normal R: (2) Normal   7) Eyes Open, Firm Surface: (2) Normal   8) Eyes Closed, Foam Surface: (1) Moderate, 4.08 sec, post LOB   9) Incline, Eyes Closed: (1) Moderate, aligns posture with floor   10) Change in Gait Speed: (1) Moderate, minimal significant change in gait speed   11) Walk with Head Turns-Horizontal: (1) Moderate, slows down  12) Walk with Pivot Turns: (2) Normal  13) Step Over Obstacles: (2) Normal   14) TUG with Dual Tasks: (1) Moderate  TU.35 (8.5 sec, 8.21 sec)  Cognitive TUG:  10.75 sec (70 to 59 by 3's; miscounted at " "59, \"I lost my track.\")      Motor Planning / Coordination   Four Square Step Test Trial 1: 11.39*   Trial 2: 9.24      >15 sec:  Falls Risk        9.24  seconds (best of 2 trials)    Comments: Hit dowels during Trial 1    Gait   Preferred Gait Speed  6 meters in 6.01 seconds Meters/second: 0.99 Age/Gender Norm:   1.38+/-0.23 meters/second  # steps in 6 meters: 12  Assistive Device: None  Gait Speed Fall risk:  Med Falls Risk (0.6-1.2 m/s)  Gait Analysis: Decreased push off from R LE (pt has R gastroc/soleus atrophy due to long ago injury to Achilles tendon), decreased L heel strike; minimal B UE swing, slight forward flexion at hips    Fast Gait Speed  6 meters in 4.14 seconds Meters/second: 1.44 Age/Gender Norm:   1.83+/-0.44 meters/second  # steps in 6 meters: 10  Assistive Device: None  Gait Analysis: See above    Activity Tolerance   6 minute walk test:  1580 feet    RPD:  3/10   Age/Gender Norm: 1836 feet    Therapeutic activity x 10 min  -The pt's wife had several questions re: parkinsonism vs PD.  This author went into further education re: how the diagnosis is made from parkinsonism vs PD, describing the cardinal signs of PD, and the typical pathology of PD and the similarities of Parkinsonism.  PT also described, from a physical therapy perspective, that treatment is essentially the same.  PT discussed the POC with the pt and wife.  All questions and concerns addressed.    Total OP Minutes: 60      Rx Units: OP jenniferal, 1TA                        "

## 2021-06-11 NOTE — PROGRESS NOTES
Persons accompanying you (the patient) today: Wife    How have you been doing since we saw you last? Please note any concerns.  Pt. Says he has been doing fine.    Please list any surgeries or procedures you have had since we saw you last:  none    Have you had any falls since your last visit? No    Do you have any pain today? No    With whom do you currently reside? (alone, spouse, family, assisted living, nursing home)  Pt. Lives in a family home.

## 2021-06-11 NOTE — PROGRESS NOTES
Assessment / Impression   1.  Parkinsonism, likely Parkinson's disease although alternative clinically related etiology cannot be entirely excluded  2.  Other medical problems as noted in past medical history      Plan:   1.  Physical therapy  2.  Follow-up in 1 month  3.  Consider referral to the [facility]    A long conversation held with the patient and wife in attendance.  Total time for evaluation one hour with majority of time spent in counseling.  This patient with evidence of a frontal subcortical process has video swallow and neuropsychometric test evidence that is consistent with Parkinson's disease.  His clinical presentation also is quite suggestive of PDD.  More prominent autonomic nervous system dysfunction and bilaterality of findings does raise the specter of an alternative etiology however.  I went to great lengths to explain these matters to the patient and to outline a treatment program.  We will plan to see the patient back after completing physical therapy for further consultation.  I did offer neurology referral however the patient would like to wait until physical therapy has been completed.      Subjective:     HPI: Gaurav Aldridge is a 71 y.o. male with above-noted diagnoses who returns for follow-up.  MRI of the brain is essentially unremarkable with good preservation of medial temporal structures.  Screening blood tests are within normal limits.  Neuropsychometric testing does reveal evidence of a frontal subcortical process consistent with Parkinson's disease although lack of awareness appears to be possibly greater than what one might expect here..  Swallow evaluation reveals mild dysphasia with slowing of the swallow mechanism consistent with PVD as well.    Today the patient offers no new complaints.          Review of Systems:          As noted        Objective:     Vitals:    07/17/17 1347 07/17/17 1348 07/17/17 1349   BP: 150/72 144/75 140/72   Pulse: 68 77 77   Weight: 164 lb  (74.4 kg)         No results found for this or any previous visit (from the past 24 hour(s)).    Physical Exam: As noted previously.  Once again the patient demonstrates evidence of subcortical impairments both motorically and cognitively as outlined previously.

## 2021-06-11 NOTE — PROGRESS NOTES
Patient declined  visit today.      Patient will follow up with social work as needed at next appointment.     BALDOMERO Moran, Master's Level Social Work Intern  07/17/2017  03:05PM        I have read, discussed and agree with the documentation as presented by Padma Reyes.  Vivian Mckeon, MARCO  07/17/2017

## 2021-06-12 NOTE — PROGRESS NOTES
Physical Therapy  Physical Therapy  Movement Disorder Treatment Note    Date: 2017   Visit #: 3/9  Medicare  (Certification dates 2017 to 10/3/2017)  Rx Units: 1 TE, 3 NR  Total OP Minutes: 55    Pain:  Yes  Location: chronic low back pain, Ratin-2/10, Intervention: rest PRN     Subjective:  Patient reports performing exercises twice yesterday and one time on Monday following initial session.     Objective:  Therapeutic Exercise:  Total Minutes: 15  Sustained Movements (sitting)  4reps  x 1-2 sets      Exercise   Reps   Sets   Effort     1A Floor to Ceiling With Flicking   4 2 10     1B Side to Side With Flicking 4 2 8      Comments:  1A: Addition of finger flicking today. Verbal cues to slow counting and focus on big finger flicks and upright posture.   1B: Initial shaping 10% for palm up and posterior leg extension. Otherwise only minimal cues for posture and palm up.     Other:   NuStep: level 4 x 8 minutes, verbal cues to maintain SPM > 90.   Average SPM: 94  Average METs: 2.5    Neuro-Reeducation/Balance:  Total Minutes: 40  Multidirectional Repetitive Movements.  8-16reps  x 1-2 sets    Step and Reach:   UE support: none  Chairs Nearby: 2 A/B      Exercise   Reps   Sets   Effort     2A Forward Step and Reach   2 10 8     2B Sideward Step and Reach 2 10 8     2C Backward Step and Reach 3 10 6     Comments:  **'d exercises added to HEP.  2A: Verbal cues for increased return step and big hands bilaterally  2B: Verbal cues for head rotation and bilateral big arms, especially the R arm with lateral step L.   **2C: Initial modeling from therapist. First set performed without UE, so patient could understanding backward step and posterior weight shift. Addition of UE movements with final two reps. Verbal cues for toe up, big arms, posterior weight shift, big hands. Improved coordination and overall technique with final set.    Rock and Reach:  UE support:   Chairs Nearby:       Exercise   Reps   Sets    Effort     2D Left leg Forward/Backward   20 2 8     2D Right leg Forward/Backward 20 2 8     2E Side to Side        Comments:  **2D: Initial modeling from therapist. Verbal cues to start with rocking motion and add in arm swings after. Additional cues for big hands and big reach.    Other:   BIG walkin' x 1; with cognitive task (states that start with letter N and counting down by 3's starting at 30). Verbal cues for increased step length originally, patient demonstrates decreased pace with addition of states but able to maintain pace with simple counting backwards starting at 30.     Gait Training:   Total Minutes:     Time Speed (MPH) UE support Direction Incline Comments                                      Other:     Therapeutic Activity  Total Minutes:     Functional Movement  Rep  Sets  Effort    Rolling        Supine to Sit       Sit to Stand from standard chair       Sit to Stand from low, soft chair       Sit and Reach       Walk and Turn       PWR Moves:       PWR Moves:        Comments:     Big Walking:    Other:    Carryover Assignment: big walking and big sit><stands    HEP: 1 A/B, 2 A/B    Assessment/Plan for week:  2017 - 2017  Patient demonstrates good carry over with original exercises, so 2C and 2D introduced today. Spouse also given DVD to help with carryover of exercises at home.

## 2021-06-12 NOTE — PROGRESS NOTES
Physical Therapy  Physical Therapy  Movement Disorder Treatment Note    Date: 2017   Visit #: 8/9  Medicare  (Certification dates 2017 to 10/3/2017)  Rx Units: 1 TE, 3 NR  Total OP Minutes: 55    Pain:  Yes  Location: low back, Ratin-3/10, Intervention: rest PRN    Subjective: Patient notes that they are still working on exercises on a daily basis. And that patient appears to be improving with exercises and overall movements.    Objective:  Therapeutic Exercise:  Total Minutes: 15  Sustained Movements (sitting)  4reps  x 1-2 sets      Exercise   Reps   Sets   Effort     1A Floor to Ceiling With Flicking   6 1 9     1B Side to Side With Flicking   6 1 9      Comments:  1A: Minimal verbal cues for palms forward and big finger flicks.  1B: Minimal shaping for trunk rotation; 25% and slow counting to enable big finger flicks.     Other:  NuStep level 5 x 8 minutes Average SPM: 84   Average METs: 2.6    Neuro-Reeducation/Balance:  Total Minutes: 40  Multidirectional Repetitive Movements.  8-16reps  x 1-2 sets    Step and Reach:   UE support: none  Chairs Nearby: 2 A/B/C      Exercise   Reps   Sets   Effort     2A Forward Step and Reach   2 12 9     2B Sideward Step and Reach   2 12 10     2C Backward Step and Reach   2 12 9     Comments: on red foam today 2 A/B/C  2A: Verbal cues for increased return step and initial alternating of LE steps.   2B: Good overall technique and stability today.   2C: Performed without red foam today to focus on increased posterior steps and posterior weight shifts. Verbal cues for toe up with posterior steps.    Rock and Reach:  UE support:   Chairs Nearby: 2D      Exercise   Reps   Sets   Effort     2D Left leg Forward/Backward   20 2 10     2D Right leg Forward/Backward   20 2 10     2E Side to Side   12 2 9     Comments:  2D: Verbal cues for increased arm swing and big rocking today.  2E: Verbal cues for trunk rotation and big arms bilaterally.     Other:   BIG walking: with  "2.2# ball toss to therapist; 400' x 1; forward and backwards; verbal cues for increased step length especially in posterior direction.     BIG walking with ball toss and cognitive task (counting backwards by 3s starting at 70): 400' x 1; forward and backwards; Patient demonstrates significant reduction of pace with the addition of cognitive task, requiring increased verbal cues for step length    BIG walking: forward/backwards and R.L side-steppin' x 1; verbal cues to \"turn\" during gait for 180 degree turn and change in direction. No instability with any directional changes today. Minimal verbal cues for increased step length during turns.     BIG walkin' x 1, with pivot turns. Patient demonstrates improved step size with pivot and good stability. Verbal cues required initially for maximal effort.     Gait Training:   Total Minutes:     Time Speed (MPH) UE support Direction Incline Comments                                      Other:     Therapeutic Activity  Total Minutes:     Functional Movement  Rep  Sets  Effort    Rolling        Supine to Sit       Sit to Stand from standard chair       Sit to Stand from low, soft chair       Sit and Reach       Walk and Turn       PWR Moves:       PWR Moves:        Comments:     Big Walking:    Other:    Carryover Assignment: big walking and big sit><stands    HEP: 1 A/B, 2 A/B/C/D/E    Assessment/Plan for week:  2017 - 2017  Patient has one remaining visit. Plan to re-assess and discharge at next session. Patient and spouse are interested in fitness classes following therapy. Patient able to complete a floor to stand transfers independently so according to his re-assessment results patient will be in fitness 1 or 2.  "

## 2021-06-12 NOTE — PROGRESS NOTES
Physical Therapy  Physical Therapy  Movement Disorders Discharge Summary    Medical Diagnosis: Parkinsonism                                         Treatment Diagnosis: Gait Instability, Bradykinesia               Referring MD: Dr. Suman Daniels  Onset Date: 2017  Start of Care: 2017  Time of Evaluation: 1000    Date: 2017   Visit #: 9  Medicare  (Certification dates 2017 to 10/3/2017)  Rx Units: 2 TE, 2 NR  Total OP Minutes: 55     Pain:  No    Location: none     Subjective: Patient reports he is still doing exercises with his wife on a daily basis along with walking every morning.     Objective:  Therapeutic Exercise:  Total Minutes: 25    Comments:  Reviewed BIG exercises and answered any final questions regarding techniques.     Other:  NuStep level 5 x 6 minutes Average SPM: 88   Average METs: 2.7    6 Minute Walk Test: 1,776 feet  10 meter walk test: 4.49 seconds in 9 steps   Gait Speed: 1.34 meters/second  30 second sit><stand: 16 stands     Neuro-Reeducation/Balance:  Total Minutes: 30    TU.86 seconds  Cognitive TU.56 seconds   (counting backwards by 3's starting at 70;   70, 67, 64, 61, 58, 55, 52)    Balance Assessment: Mini-BEST 25/28 (<23/28)   1) Sit <> Stand: (2) normal   2) Rise to Toes: (1) moderate   3) Stand on One Leg: (1) moderate L: > 20 seconds R: 3.44 seconds   4) Compensatory Stepping Correction-Forward: (2) normal   5) Compensatory Stepping Correction-Backward: (1) moderate, 3 steps   6) Compensatory Stepping Correction-Lateral: (2) normal L: (2) normal R: (2) normal   7) Eyes Open, Firm Surface: (2) normal   8) Eyes Closed, Foam Surface: (2) normal   9) Incline, Eyes Closed: (2) normal   10) Change in Gait Speed: (2) normal   11) Walk with Head Turns-Horizontal: (2) normal  12) Walk with Pivot Turns: (2) normal  13) Step Over Obstacles: (2) normal   14) TUG with Dual Tasks: (2) normal  TU.86 seconds  Cognitive TU.56 seconds (see above)      Transitional  Movements   Initial Post Treatment Goals      Sit ? Stand       360 degree turn (steps)      TUG (sec) 8.35 7.86    TUG with Cognitive task (sec) 10.75 8.56 < 10   TUG with Motor task (sec)      Strength  Initial Post Treatment Goals   30sec Chair Stand (#)  19 16    Balance  Initial Post Treatment Goals   Romberg (EO/EC)    /       /       /      Sharpened Romberg (EO/EC)    /       /       /      Semi-Romberg (EO/EC)    /       /       /      Mini- BESTest    21/28 25/28       >/= 25/28      FG       Other SLS   R/L  (sec)  4.97 / 12.08 3.44 / > 20     Motor Planning/Coordination Initial Post Treatment Goals   Four Square Step Test      (best of 2 trials) (sec) 9.24     Gait Initial Post Treatment Goals   Gait Speed in 6m (sec)  4.49    Meters/seconds .99 1.34 >/= 1.18   # steps in 6m 12 9 </= 10   Activity Tolerance Initial Post Treatment Goals   6 min walk test (ft)  1,580 1,776  >/= 1,830       Functional Outcome Changes: Patient demonstrates improved walking, balance, and overall mobility. Patient is able to demonstrates floor to stand transfers without any assistance. Patient and spouse note that they are walking at a faster pace during their daily walks.    Evaluation: Goals Met? Partially Comments: Patient unable to met goal for 6 MWT, although he increased distance by almost 200 feet.    Patient seen from 6/26/2017 to 8/16/2017 for 9 treatment sessions, including evaluation.  Recommendation: D/C, with follow-up in 9 months and daily HEP. Patient also interested in fitness classes. And will begin fitness 1 ASAP.    Physician Recommendation:  1. I certify the need for these services furnished within this plan and while under my care. I agree with the therapist's recommendation for plan of care.    2. If there is any recommendation for modification of therapy plan, please indicate below.      Physician's Signature (Printed):  _____________________________

## 2021-06-12 NOTE — PROGRESS NOTES
Physical Therapy  Physical Therapy  Movement Disorder Treatment Note    Date: 2017   Visit #: 2/9  Medicare  (Certification dates 2017 to 10/3/2017)  Rx Units: 2 TE, 1 NR, 1 TA  Total OP Minutes: 55    Pain:  Yes  Location: chronic low back pain, Ratin-2/10, Intervention: rest PRN     Subjective:  Patient reports no significant changes and no falls since coming in for initial evaluation.    Objective:  Therapeutic Exercise:  Total Minutes: 25  Sustained Movements (sitting)  4reps  x 1-2 sets      Exercise   Reps   Sets   Effort     1A Floor to Ceiling Without Flicking   4 2 7,9     1B Side to Side Without Flicking 4 2 7      Comments: **'d exercises given as part of HEP  **1A: Initial modeling from therapist. Verbal cues for big hands, tall posture, and increased effort  **1B: Initial modeling from therapist. 15% shaping for palm up, posterior leg extension, and trunk rotation. Verbal cues for increased posterior leg extension during second set.     Patient instructed in effort scale in relation to BIG movements. Education also provided on large amplitude (BIG) theory and carry over to everyday tasks.     Other:   NuStep: level 3 x 8 minutes, verbal cues to maintain SPM > 70.   Average SPM: 90   Average METs: 2.3    Neuro-Reeducation/Balance:  Total Minutes: 15  Multidirectional Repetitive Movements.  8-16reps  x 1-2 sets    Step and Reach:   UE support: none  Chairs Nearby: 2 A/B      Exercise   Reps   Sets   Effort     2A Forward Step and Reach   3 10 8     2B Sideward Step and Reach 3 10 8     2C Backward Step and Reach        Comments:   **2A: Verbal cues for increased return step, big arms/hands, and increased tep height while decreasing step length.  **2B: Verbal cues for bilateral hands up, especially with L lateral step, palms up and head rotation in direction of step.    Rock and Reach:  UE support:   Chairs Nearby:       Exercise   Reps   Sets   Effort     2D Left leg Forward/Backward           2D Right leg Forward/Backward        2E Side to Side        Comments:  Other:     Gait Training:   Total Minutes:     Time Speed (MPH) UE support Direction Incline Comments                                      Other:     Therapeutic Activity  Total Minutes: 15     Functional Movement  Rep  Sets  Effort    Rolling        Supine to Sit       Sit to Stand from standard chair 10 1 7    Sit to Stand from low, soft chair 5 2 7    Sit and Reach       Walk and Turn       PWR Moves:       PWR Moves:        Comments:   Sit><stand: Verbal cues for increased anterior weight shift especially from low surface. With maximal effort on each stand.     Big Walking:   -200' x 2; Initial verbal cues for exaggeration of step length, then increasing pace to normalize step pattern.  With increase pace patient demonstrates improved arm swing bilaterally.     -300' x 1; ambulation while naming states that start with letter M, minimal verbal cues to maintain step length and pace with addition of cognitive task.    Other:    Carryover Assignment: big walking and big sit><stands    HEP: 1 A/B, 2 A/B    Assessment/Plan for week:  7/24/2017 - 7/28/2017  Patient demonstrates good understanding and technique of initial four exercises. Responds well to verbal cues and initial modeling/shaping from therapist. Spouse present during sessions and patient/spouse verbalized understanding of importance of performing exercises daily.  Spouse even recorded standing exercises on phone to help with carryover between sessions. Plan to advance exercises and add 2 C/D at next session. Patient will also benefit from dual task and high level balance training.

## 2021-06-12 NOTE — PROGRESS NOTES
Physical Therapy  Physical Therapy  Movement Disorder Treatment Note    Date: 7/31/2017   Visit #: 4/9  Medicare  (Certification dates 7/6/2017 to 10/3/2017)  Rx Units: 1 TE, 3 NR  Total OP Minutes: 60    Pain:  No    Subjective:  Patient reports performing exercises daily since last therapy session.    Objective:  Therapeutic Exercise:  Total Minutes: 15  Sustained Movements (sitting)  4reps  x 1-2 sets      Exercise   Reps   Sets   Effort     1A Floor to Ceiling With Flicking   6 1 6     1B Side to Side With Flicking 6 1 8      Comments:  1A: Verbal cues for increased effort with each rep. Additional cues for slowed count and big finger flicks.   1B: Verbal cues for big finger flicks, posture, and palm up.     Other:   NuStep: level 4 x 8 minutes, verbal cues to maintain SPM > 90.   Average SPM: 94  Average METs: 2.4    Neuro-Reeducation/Balance:  Total Minutes: 45  Multidirectional Repetitive Movements.  8-16reps  x 1-2 sets    Step and Reach:   UE support: none  Chairs Nearby: 2C      Exercise   Reps   Sets   Effort     2A Forward Step and Reach   2 10 8,7     2B Sideward Step and Reach 2 10 9     2C Backward Step and Reach 2 10 8     Comments:  2A: Improved stability today, verbal cues for increased return step, big hands, and arms bilaterally.   2B: Good exercise recall, minimal verbal cues initially head rotation and palms up.   2C: Performed in corner of room to provide patient will greater support to allow for greater confidence with posterior weight shift and greater toe up.     Rock and Reach:  UE support:   Chairs Nearby: 2D      Exercise   Reps   Sets   Effort     2D Left leg Forward/Backward   20 2 10     2D Right leg Forward/Backward 20 2 10     2E Side to Side        Comments:  2D: Verbal cues and 10% shaping for posterior arm swing L>R. Additional cues to maintain big rocking motion throughout exercise    Other:   Walking over foam discs: x10; Verbal cues for increased step height and step length  with each step to avoid catching toe when transferring to the next foam disc. No LOB and no assistance required for task.    Standing on foam disc performing trunk rotation to toss 4.4# ball to therapist behind patient: x5 in both direction on each foam disc: Verbal cues to maintain engaged core with exercise. No c/o low back pain with movement, additional verbal cues for increased rotation L > R. Patient had two LOB forward with rotation on green foam, able to self correct with stepping reaction.     Gait Training:   Total Minutes:     Time Speed (MPH) UE support Direction Incline Comments                                      Other:     Therapeutic Activity  Total Minutes:     Functional Movement  Rep  Sets  Effort    Rolling        Supine to Sit       Sit to Stand from standard chair       Sit to Stand from low, soft chair       Sit and Reach       Walk and Turn       PWR Moves:       PWR Moves:        Comments:     Big Walking:    Other:    Carryover Assignment: big walking and big sit><stands    HEP: 1 A/B, 2 A/B/C/D    Assessment/Plan for week:  7/31/2017 - 8/4/2017  Patient demonstrates good balance and stability with HEP requiring minimal cues to maintain amplitude throughout movement and keep maximal effort during exercise. Patient does however have difficulty with coordination of UE and LE movement during exercise 2C. Plan to further progress exercises with addition of foam and possibly add exercise 2E to HEP. Spouse reports getting the video to work at home so they will have another resource for exercise recall. Plan to also progress dual task and obstacle course activities to further improve balance and coordination.

## 2021-06-12 NOTE — PROGRESS NOTES
Physical Therapy  Physical Therapy  Movement Disorder Treatment Note    Date: 8/7/2017   Visit #: 6/9  Medicare  (Certification dates 7/6/2017 to 10/3/2017)  Rx Units: 1 TE. 3 NR  Total OP Minutes: 60    Pain:  No    Subjective: Patient and spouse continue to report performing exercises on a daily basis.     Objective:  Therapeutic Exercise:  Total Minutes: 15  Sustained Movements (sitting)  4reps  x 1-2 sets      Exercise   Reps   Sets   Effort     1A Floor to Ceiling With Flicking   6 1 8     1B Side to Side With Flicking   6 1 9      Comments:  1A: Good posture and overall technique today.    1B: Verbal cues for increased rotation and posterior leg extension.    Other:  NuStep level 5 x 10 minutes Average SPM: 84  Average METs: 2.7    Neuro-Reeducation/Balance:  Total Minutes: 40  Multidirectional Repetitive Movements.  8-16reps  x 1-2 sets    Step and Reach:   UE support: none  Chairs Nearby: 2 A/B/C      Exercise   Reps   Sets   Effort     2A Forward Step and Reach   2 10 8     2B Sideward Step and Reach   2 10 9     2C Backward Step and Reach   2 10 8     Comments: on yellow foam today 2 A/B/C  2A: Verbal cues for increased return step and big arm/hand movement during exercise.   2B: Improved stability today, verbal cues for head rotation and palms up.   2C: Initial cueing for step back, reach back, and toe up. Improved stability and overall form today, especially since yellow foam added to exercise today. No LOB and only minimal cues for toe up today.    Rock and Reach:  UE support:   Chairs Nearby: 2D      Exercise   Reps   Sets   Effort     2D Left leg Forward/Backward   20 2 9     2D Right leg Forward/Backward   20 2 9     2E Side to Side   10 2 8     Comments:  2D: Verbal cues for increased effort and maximal arm swing especially L UE. With increased verbal cues for UE movement patient demonstrates reduce rocking motion.   2E: Initial shaping for trunk rotation and big arms 50% Improved technique with  simple verbal cues for big arms and big trunk rotation.     Other:   BIG walking: with 2.2# ball toss to therapist; 300' x 1; verbal cues to maintain big steps with addition of verbal cues. Following verbal cues patient able to maintain step length.    BIG walking with ball toss and cognitive task (pro sport teams mascots): 300' x 1; patient demonstrates difficulty maintaining pace with addition of cognitive task.    Standing on foam disc performing trunk rotation to toss 2.2# ball to therapist behind patient: x5 in both direction on each foam disc: Verbal cues to maintain engaged core with exercise. No c/o low back pain with movement, additional verbal cues for increased rotation L > R. Patient had two LOB forward with rotation on green foam, able to self correct with stepping reaction.      Gait Training:   Total Minutes:     Time Speed (MPH) UE support Direction Incline Comments                                      Other:     Therapeutic Activity  Total Minutes:     Functional Movement  Rep  Sets  Effort    Rolling        Supine to Sit       Sit to Stand from standard chair       Sit to Stand from low, soft chair       Sit and Reach       Walk and Turn       PWR Moves:       PWR Moves:        Comments:     Big Walking:    Other:    Carryover Assignment: big walking and big sit><stands    HEP: 1 A/B, 2 A/B/C/D/E    Assessment/Plan for week:  8/7/2016 - 8/11/2017  Patient continues to demonstrate improvements in all exercises, requiring less verbal cues for technique and only minimal cues for exercise recall. Patient has difficulty maintain amplitude with addition of any cognitive tasks, requiring greater verbal cue for amplitude/effort during these times. Plan to continue to progress exercises and focus on dual tasks activities with remaining three visits.

## 2021-06-12 NOTE — PROGRESS NOTES
Physical Therapy  Physical Therapy  Movement Disorder Treatment Note    Date: 8/2/2017   Visit #: 5/9  Medicare  (Certification dates 7/6/2017 to 10/3/2017)  Rx Units: 1 TE. 3 NR  Total OP Minutes: 60    Pain:  No    Subjective:  Patient reports performing exercises on a daily basis. Spouse notes that patient is taking bigger steps on a daily basis.     Objective:  Therapeutic Exercise:  Total Minutes: 10  Sustained Movements (sitting)  4reps  x 1-2 sets      Exercise   Reps   Sets   Effort     1A Floor to Ceiling With Flicking   6 1 9     1B Side to Side With Flicking   6 1 9      Comments:  1A: Verbal cues to slow counting in order increased finger extension.   1B: Performed on chair, patient has increased difficulty with knee extension L > R.     Other:    Neuro-Reeducation/Balance:  Total Minutes: 45  Multidirectional Repetitive Movements.  8-16reps  x 1-2 sets    Step and Reach:   UE support: none  Chairs Nearby: 2 A/B/C      Exercise   Reps   Sets   Effort     2A Forward Step and Reach   2 10 8     2B Sideward Step and Reach   2 10 9     2C Backward Step and Reach   2 10 8     Comments:  2A: Slight instability with addition of yellow foam today. Patient able to self correct with ankle, knee, and hip strategies. Using stepping reaction and nearby chairs once for LOB.   2B: Good stability with addition of yellow foam today. Minimal verbal cues initially for palms up and B UE flexion.   2C: Improved technique today only requiring verbal cues for increased step length and toe up with each rep.    Rock and Reach:  UE support:   Chairs Nearby: 2D      Exercise   Reps   Sets   Effort     2D Left leg Forward/Backward   20 2 9     2D Right leg Forward/Backward   20 2 9     2E Side to Side   10 2 8     Comments: **'d exercises added to HEP  2D: Verbal cues for increased effort and maximal arm swing especially L UE.   **2E: Initial shaping 50% for trunk rotation, arm movement, and pivot turns. Good stability and improved  technique with practice. Patient demonstrates increased rotation with R rotation.     Other:   BIG walking: with verbal cues for change of pace, turn and stops, quick stops, horizontal head turns; 400' x 1, No LOB with changes, patient demonstrates slight decreased in pace and step length with head turns and backwards walking.     Gait Training:   Total Minutes:     Time Speed (MPH) UE support Direction Incline Comments                                      Other:     Therapeutic Activity  Total Minutes:     Functional Movement  Rep  Sets  Effort    Rolling        Supine to Sit       Sit to Stand from standard chair       Sit to Stand from low, soft chair       Sit and Reach       Walk and Turn       PWR Moves:       PWR Moves:        Comments:     Big Walking:    Other:    Carryover Assignment: big walking and big sit><stands    HEP: 1 A/B, 2 A/B/C/D/E    Assessment/Plan for week:  7/31/2017 - 8/4/2017  See last daily treatment note for complete weekly summary

## 2021-06-12 NOTE — PROGRESS NOTES
Persons accompanying you (the patient) today: Wife: Nadira    How have you been doing since we saw you last? Please note any concerns.  Pt . Has been good. Wife has some questions for the provider.    Please list any surgeries or procedures you have had since we saw you last:  None    Have you had any falls since your last visit? No    Do you have any pain today? No    With whom do you currently reside? (alone, spouse, family, assisted living, nursing home)  Pt . Lives in a house with his wife.

## 2021-06-12 NOTE — PROGRESS NOTES
Physical Therapy  Physical Therapy  Movement Disorder Treatment Note    Date: 2017   Visit #: 7/  Medicare  (Certification dates 2017 to 10/3/2017)  Rx Units: 1 TE. 3 NR  Total OP Minutes: 55    Pain:  Yes  Location: low back, Ratin-2/10, Intervention: rest PRN    Subjective: Patient notes that they are still working on exercises on a daily basis.     Objective:  Therapeutic Exercise:  Total Minutes: 15  Sustained Movements (sitting)  4reps  x 1-2 sets      Exercise   Reps   Sets   Effort     1A Floor to Ceiling With Flicking   6 1 9     1B Side to Side With Flicking   6 1 9      Comments:  1A: Good posture and overall technique today.  Along with exercise recall. Minimal verbal cues for palm forward  1B: Verbal cues for increased rotation and posterior leg extension. Rotation R and L posterior leg.     Other:  NuStep level 5 x 8 minutes Average SPM: 87   Average METs: 2.7    Neuro-Reeducation/Balance:  Total Minutes: 40  Multidirectional Repetitive Movements.  8-16reps  x 1-2 sets    Step and Reach:   UE support: none  Chairs Nearby: 2 A/B/C      Exercise   Reps   Sets   Effort     2A Forward Step and Reach   2 12 8     2B Sideward Step and Reach   2 12 10     2C Backward Step and Reach   2 12 10     Comments: on red foam today 2 A/B/C  2A: Verbal cues for increased return step R>L. Patient had one episode catching R foot with return step but able to self correct.   2B: Good stability, minimal verbal cues for head rotation.   2C: Good stability on red foam. Verbal cues for increased posterior step and toe up.     Rock and Reach:  UE support:   Chairs Nearby: 2D      Exercise   Reps   Sets   Effort     2D Left leg Forward/Backward   20 2 9     2D Right leg Forward/Backward   20 2 9     2E Side to Side   10 2 8     Comments:  2D: Verbal cues for increased effort and maximal arm swing especially L UE. With increased verbal cues for UE movement patient demonstrates reduce rocking motion and vice versa with  cues for big rocking B UE swing is reduced.   2E: Initial shaping for trunk rotation and big arms 50% Improved technique with simple verbal cues for big arms and big trunk rotation.  Verbal cues for big initial rotation to help with maximal amplitude throughout movement.     Other:   BIG walking: with 2.2# ball toss to therapist; 400' x 1; forward and backwards; verbal cues to maintain big steps and foot clearance especially in backward direction. No LOB.    BIG walking with ball toss and cognitive task (state capitals, counting backwards by 3s starting at 60): 400' x 1; forward and backwards; patient demonstrates difficulty maintaining pace with addition of cognitive task, but able to increased step length and pace with counting backwards(more familiar task).    BIG walking forward while tossing and catching green ball from therapist behind patient: 400' x 1: Patient demonstrates good stability and able to maintain step length and pace with forward walking and ball toss.    BIG walkin' x 1, patient demonstrates good step length and heel strike without verbal cues following therapy session today.    Gait Training:   Total Minutes:     Time Speed (MPH) UE support Direction Incline Comments                                      Other:     Therapeutic Activity  Total Minutes:     Functional Movement  Rep  Sets  Effort    Rolling        Supine to Sit       Sit to Stand from standard chair       Sit to Stand from low, soft chair       Sit and Reach       Walk and Turn       PWR Moves:       PWR Moves:        Comments:     Big Walking:    Other:    Carryover Assignment: big walking and big sit><stands    HEP: 1 A/B, 2 A/B/C/D/E    Assessment/Plan for week:  2016 - 2017  See last treatment session for complete weekly summary.

## 2021-06-12 NOTE — PROGRESS NOTES
Assessment / Impression   1.  Parkinsonism, likely Parkinson's disease  2.  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  Continue physical therapy  2.  I once again advised behind the wheel driving test although I do believe the patient likely is fine behind the wheel with self restrict her driving at this time    Long conversation held with the patient and spouse in attendance.  We discussed the patient's current clinical condition and treatment options.  At this point in time medication therapy does not appear to be absolutely required and the patient appears most comfortable in continuing with physical therapy programming alone.  I also advised wellness activities including diet daily routine etc.  He is feeling more energetic and more confident in his mobility and movements having completed physical therapy.    Total time for this evaluation 30 minutes with majority of time spent in counseling.  I also discussed with the patient aspects of health maintenance and the role of a primary care physician.  I suggested he may seek out a primary care physician in [place] as a do winter there every year.      Subjective:     HPI: Gaurav Aldridge is a 72 y.o. male with above-noted diagnoses who is seen in follow-up.  The patient is feeling more energetic and more confident in his mobility following physical therapy.  He continues to note right upper extremity tremors with occasional bilateral tremors.  As noted, the patient does appear to have an MCI level of cognitive impairment that is quite consistent with what might be seen in Parkinson's disease.  There have been some aspects of the patient's clinical presentation that might suggest an alternative etiology but certainly time will tell.          Review of Systems:          As above        Objective:     Vitals:    08/21/17 1023 08/21/17 1024 08/21/17 1025   BP: 169/73 150/72 138/69   Pulse: 65 70 76   Weight: 164 lb (74.4 kg)         No results found for this or any previous  visit (from the past 24 hour(s)).    Physical Exam: As noted previously.  The patient continues to demonstrate some masking of the face, bradykinesia and possibly a mild amount of cognitive slowing.  No delirium noted.  Affect is pleasant mood congruent.

## 2021-06-17 NOTE — PROGRESS NOTES
Assessment / Impression   1.  Parkinson's disease  2.  Cognitive disorder secondary to above  3.  Other medical problems as noted past medical history      Plan:   1.  Continue present therapies including Sinemet 25/100 3 times daily  2.  Referral to [facility]  3.  Wellness programming    A long conversation held with the patient and spouse in attendance.  Total time for evaluation 30 minutes with majority time spent counseling.  At this point in time the patient appears to be doing reasonably well on present therapies.  I did offer a referral to the [facility] and the patient and spouse are willing to proceed.  I will plan for follow-up in 6 months if needed.          Subjective:     HPI: Gaurav Aldridge is a 72 y.o. male with above-noted diagnosis was seen in follow-up.  The patient has done substantially better following initiation of therapy with Sinemet.  Sinemet was initiated in January.  No untoward effects noted.  At this point in time the patient reports feeling quite well.          Review of Systems:          As above        Objective:     Vitals:    04/25/18 0953   BP: 152/72   Pulse: 64       No results found for this or any previous visit (from the past 24 hour(s)).    Physical Exam: As noted previously.  The patient does demonstrate some masking of the face and some mild hypophonia.  No cogwheel rigidity or tremors noted in the upper extremities.  Cognitively I note no fluctuation in level of consciousness or distractibility.  Cognitive processing slightly reduced affect is pleasant and mood congruent.

## 2021-06-17 NOTE — PROGRESS NOTES
Persons accompanying you (the patient) today: wife Nadira    How have you been doing since we saw you last? Please note any concerns.  Pretty good, Once in a while PT will get light headed and dizzy. Here for a F/U    Please list any recent hospitalizations/surgeries/procedures you've had since we saw you last:  no    Have you had any falls since your last visit? No    Do you have any pain today? No

## 2021-06-18 NOTE — PROGRESS NOTES
"Physical Therapy  PHYSICAL THERAPY  MOVEMENT DISORDER:  INITIAL EVALUATION    SUBJECTIVE INFORMATION    Diagnosis: Parkinson's Date of diagnosis: 3/2018   Date of last Neurologist visit: 2018 with Dr. Daniels  Treatment Diagnosis: gait instability  Precautions/pmh: n/a  First movement disorder symptom presentation: wife noticed poor listening and hearing Date:   Non-motor symptoms: Orthostatic hypotension and Fatigue     Time of Evaluation: 10:00     Time of last PD med: 7:00      Time of next PD med: 12:00  On/Off presentation/symptoms: No  History of PD specific PT? Yes: When and where?: Summer 2017    Pain: No    Living Situation:[facility] 3 step entry, 17 steps into basement, with wife  Caregiver Support: Wife   Equipment: none  Current Activity Level: exercises daily, trying to get back into walking, currently walks 1x/wk for a mile  Chief Mobility Complaint: none at the time   Patient's Functional Goal: \"nothing is really bothering me right now\"    Fall history:None    Freezing: Never      OBJECTIVE INFORMATION    Cognition: comments: able to answer questions and follow commands    Bradykinesia and Hypokinesia (Combining slowness, hesitency, decreased arm swing, small amplitude and poverty of movement in general):  Minimal    Dyskinesia:No    Posture: Normal     Postural Stability: Posterior tug test (Response to sudden posterior displacement produced by pull on shoulders while patient is erect, with eyes open and feet slightly apart.  Patient is prepared.)  Normal    Transitional Movements  Sit?Stand:   Independent       Sit? Supine:   Not Tested  360 degree turn:  6 steps    TUG(Timed Up and Go): 7.53 seconds    (>13 sec:  Falls Risk)      Divided Attention   Cognitive TU.94 seconds (Task):  Start at 70, count backwards by 3's  (70, 67, 64, 61, 60)   Motor TUG:    seconds (Task):     Cognitive and Motor TUG:  seconds (Task):      Strength   30 Second Chair Stand:  #18 Without UE support  " Age/Gender Norm:14 stands       Balance      Eyes open Eyes closed     Romberg (sec)       Semi-Romberg (sec)       Sharpened Romberg (sec)      Left LE Right LE     Single Leg Stance (sec) 13.59 7.64      Balance Comments    Balance Assessment: .  Balance Assessment: Mini-BEST /28 (<)   1) Sit <> Stand: (2) normal   2) Rise to Toes: (1) moderate   3) Stand on One Leg: (1) moderate L: 13.59 R: 7.64   4) Compensatory Stepping Correction-Forward: (2) normal   5) Compensatory Stepping Correction-Backward: (2) normal   6) Compensatory Stepping Correction-Lateral: (2) normal L: (2) normal R: (2) normal   7) Eyes Open, Firm Surface: (2) normal   8) Eyes Closed, Foam Surface: (2) normal   9) Incline, Eyes Closed: (2) normal   10) Change in Gait Speed: (2) normal   11) Walk with Head Turns-Horizontal: (2) normal  12) Walk with Pivot Turns: (2) normal  13) Step Over Obstacles: (2) normal   14) TUG with Dual Tasks: (1) moderate  TU.53  Cognitive TU.94        Motor Planning / Coordination   Four Square Step Test Trial 1: 8.53  Trial 2: 8.69      >15 sec:  Falls Risk        8.53  seconds (best of 2 trials)    Comments: Pt stepped on dowel during trial 2     Gait   Preferred Gait Speed  6 meters in 4.63 seconds Meters/second: 1.30 Age/Gender Norm:   1.38 +/- .23 meters/second  # steps in 6 meters: 9  Assistive Device: none  Gait Speed Fall risk:  Low Falls Risk (>1.2 m/s)  Gait Analysis: decreased arm swing B    Fast Gait Speed  6 meters in 3.75 seconds Meters/second: 1.60 Age/Gender Norm:   1.83 +/- .44 meters/second  # steps in 6 meters: 8  Assistive Device: none  Gait Analysis: see above    Activity Tolerance   6 minute walk test: 1826  feet    RPD:  1/10  Age/Gender Norm: 1,665 feet    MEDICARE PATIENTS:  HICN # [id number]  Provider #   Certification Dates: from 2018 to 2018    ASSESSMENT:   Pt is a 72 year old male seen for follow up evaluation due to progressive nature of disease. [facility], is independent in ADL's/IADL's, and does not use an AD for mobility. Pt tries to stay physically active doing exercises daily and trying to get back into walking everyday.  At this time, he has no concerns and feels that he is doing fairly well overall.  Today, he was above age and gender norms for the 6MWT and 30 second sit to stand test, but his gait speed was slightly less than the norms.  He maintained his score on the MiniBEST Test and is not at risk for falls according to the TUG and 4 square step test. Overall, his scores today were the same or very similar to his last evaluation in the summer of 2017. Since has been able to maintain his physical mobility and function, no further physical therapy is recommended at this time. Recommend follow up in 10 months for re-evaluation due to the progressive nature of disease.     Rolanda Velazquez, PT Student   SPT under direct supervision of PT with PT directing treatment and plan of care.  Paul Abraham, PT/DPT    Total OP Minutes: 60      Rx Units: 1 OP Evaluation

## 2021-06-18 NOTE — PROGRESS NOTES
Assessment /Plan:  Right handed, came with wife.  Referred by Dr. Suman Daniels for Parkinson disease.  Parkinson disease: There are some atypical features including lack of dramatic asymmetry even though left-sided slightly more affected with poor coordination and tremor but the difference is subtle and he is a right-handed person.  He also has some limitation of upgaze at times poor balance and memory difficulty within 1 year of diagnosis of Parkinson's.  We will watch out for parkinsonian syndrome.  He is responding to Sinemet adequately and no wearing off or side effects.  Will continue current medication.  Plan to see him in about 3 months before heading to [place] for the winter time.  He will not taking Parkinson medication on the day when he comes to clinic but bring with him to see how he responds to the pill at next visit.    Memory difficulty: He scored 22/30 on MO CA.  Consider adding medication for this.  So far no behavioral changes or confusion spells.  No hallucinations.    Some documented blood pressure drop but no significant autonomic dysfunction yet.  Blood pressure little high need to follow-up with primary on that.          Plan:  Return clinic in 3 months  Exercise daily  Bring pills to next visit for Parkinson disease but hold off taking the medication until told so on day of next clinical visit.  Consider more blood test workup for dementia.    Chart reviewed extensively.  Provided education on Parkinson disease, differential diagnosis, treatment options etc.  Total time spent more than 90 minutes.  More than 50% of counseling.    History review:  About 3 years ago in 2015 he was noted to have some cognitive and memory difficulty.  He needed some direction to navigate eating strange environment or new locations.  Over the past couple of years there is some imbalance and poor coordination.  Started in 2017 hand tremor started with left side and within 1 year it started to affect the right side.   His general motor function has slowed down including ambulation.  His speech becoming soft.  Some drooling when he sleeps.  He has had some REM sleep behavior including screaming and daydreaming for about 5 years initially noted in 2013.    No depression or anxiety.  No behavioral changes.  No hallucinations.  Hand writing has becoming less legible.    He was started on carbidopa levodopa currently on 25/101 tablet at about 7 AM, noon and 5 PM.  No side effects of medication and no wearing off effect.  In general he felt better with tremor and coordination and motor function little faster.  He is able to put his clothes on easier.  The benefit may not be traumatic but definitely noticeable.  When he forgets to take his pills his wife would not notice the difference.    In Dr. Guajardo's note there was some documentation of blood pressure drop.  However again today's clinical exam he did not demonstrate orthostatic hypotension.  He denies any significant lightheadedness or dizziness feelings.  Does not feel fatigued or tired.  No shoulder pains.  Occasionally he feels tingling in his body.    No falls and does not walk with any assisted device.    Has had therapy and staying very active.    No family history of Parkinson disease. His parents  in the 90s.  Mother had breast cancer and heart disease, his father passed away 6 months later after his mom passed.    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    Workups  MRI of the brain 2017 showing small vessel ischemic changes otherwise unremarkable.      Review of system negative              Current Outpatient Prescriptions   Medication Sig Dispense Refill     aspirin 81 mg chewable tablet Chew 81 mg daily.       calcium carbonate-vitamin D3 (CALCIUM 600 WITH VITAMIN D3) 600 mg(1,500mg) -400 unit cap Take by mouth.       carbidopa-levodopa (SINEMET)  mg per tablet Take 1 tablet by mouth 3 (three) times a day. Take 30  minutes before meals 90 tablet 5     magnesium oxide 400 mg cap        multivitamin with minerals (THERA-M) 9 mg iron-400 mcg Tab tablet Take 1 tablet by mouth daily.       No current facility-administered medications for this encounter.        Review of patient's allergies indicates no known allergies.    No past medical history on file.    Social History     Social History     Marital status:      Spouse name: N/A     Number of children: N/A     Years of education: N/A     Occupational History     Not on file.     Social History Main Topics     Smoking status: Not on file     Smokeless tobacco: Not on file     Alcohol use Not on file     Drug use: Not on file     Sexual activity: Not on file     Other Topics Concern     Not on file     Social History Narrative       No family history on file.    Objective:  Vitals:    06/27/18 0855   BP: 161/88   Pulse: 64     No acute distress sitting in chair.  Dystrophic skin changes in feet but no edema.  No skin rashes.  MOCA 22/30  Normal mental status, language, soft and slightly slurred speech.  Cranial nerve II through XII significant for hard of hearing and wearing hearing aids, some upgaze limitation, poor saccade and smooth pursuit   Normal muscle bulk and strength in 4 limbs.  Increased muscle tone with rigidity bilaterally   No focal sensory difficulty  Deep tendon reflexes reduced in the ankles but 2+ in the knees symmetrically.  No Babinski signs.  Bready kinesia and hypokinesia, mild to moderate degree.  Rare occasional left hand resting tremor.  Poor coordination worse on the left side including arm and legs.  Slightly wide-based gait and poor balance with poor test.  Slightly stooped over body posture.  No orthostatic hypotension on exam.

## 2021-06-20 NOTE — PROGRESS NOTES
How have you been doing since we last saw you? Most important neurological symptom or concern for this visit (Medication wearing off, hallucinations, freezing, pain, sleep difficulty, constipation etc.) Restless leg from 2pm on, sleeping issues.     How many falls has the pt. Had over the last year?(if pt. Falls frequently, daily, weekly, monthly?) None     Best explanation of falls (poor balance, fail/recognizing/judgement, trip, dizzy, moving too quickly, carrying too much, poor lighting, any loss of consciousness, confusion, incontinence, tongue biting, or any unusual features of events)? N/A      Any pains? (Locations, frequency, positional factors, time pattern, aggravating factors.) None

## 2021-06-20 NOTE — PROGRESS NOTES
Assessment /Plan:  73 year-old male with a history of parkinsonism and neurocognitive disorder. We did ON/OFF testing today with improvement in his tone and bradykinesia. By history there also seems to be levodopa-responsiveness. This makes idiopathic Parkinson Disease a possible diagnosis. However will still have to consider an atypical parkinsonism, perhaps Lewy Body Dementia given his preceding cognitive changes.    For his waking at night will add a CR Sinemet as it may be related to some shoulder stiffness. Will also increase Sinemet dose as he did well on 2 tabs on ON/OFF testing today.         Plan:   -add Sinemet CR 50/200 at bedtime  -increase Sinemet to 2 tabs three times a day    Follow-up when he returns from [place]      History:  Gaurav Aldridge is a 73 year-old male with a history of parkinsonism with preceding cognitive changes who presents in follow-up. He has been felt to be levodopa-responsive in the past however a concern for atypical parkinsonism still exists due to his cognitive changes.    He reports that the Sinemet helps with the tiredness in his shoulders after initially stating he hasn't noticed much of an effect. His wife thinks it helps with his tremor.       Motor fluctuations: Tremor mostly comes back at night, no major fluctuations during day.    Dyskinesia: None    Sleep: Waking up a lot during the night. Shoulders feel numb.  Sleeps 2-3 hours during day as well.     Psych: OK, fairly happy.     Falls: None     Autonomic: No orthostasis.  But he reports some lightheadedness and dizziness at times with standing position in the summer.  Recommended to increase fluid intake.     Memory: No hallucinations. Usually remembering to take meds.     ROS:  Review of system otherwise negative      No past medical history on file.    Current Outpatient Prescriptions   Medication Sig Dispense Refill     aspirin 81 mg chewable tablet Chew 81 mg daily.       calcium carbonate-vitamin D3 (CALCIUM 600 WITH  VITAMIN D3) 600 mg(1,500mg) -400 unit cap Take by mouth.       carbidopa-levodopa (SINEMET)  mg per tablet Take 1 tablet by mouth 3 (three) times a day. Take 30 minutes before meals 90 tablet 5     magnesium oxide 400 mg cap        multivitamin with minerals (THERA-M) 9 mg iron-400 mcg Tab tablet Take 1 tablet by mouth daily.       No current facility-administered medications for this encounter.          No Known Allergies    Social History     Social History     Marital status:      Spouse name: N/A     Number of children: N/A     Years of education: N/A     Occupational History     Not on file.     Social History Main Topics     Smoking status: Former Smoker     Smokeless tobacco: Never Used     Alcohol use 0.6 oz/week     1 Glasses of wine per week     Drug use: Not on file     Sexual activity: Not on file     Other Topics Concern     Not on file     Social History Narrative     No narrative on file       Family History   Problem Relation Age of Onset     Dementia Neg Hx      Parkinsonism Neg Hx        Exam:  /77 (Patient Position: Sitting)  Pulse 70  Awake and alert. Fluent. Affect normal.  Mild hypomimia. Mild hypophonia. Versions full. Facial expression symmetric. Tongue protrudes midline  Left more than right arm and leg bradykinesia (mild/moderate). Moderate rigidity in neck and all four extremities. No dyskinesias. No resting tremor. No postural tremor. No kinetic tremor.  No dysmetria on finger nose finger  Can rise from chair with arms crossed. Gait narrow-based but stooped with slightly decreased stride length and slight decreased arm swing.    The first exam demonstrated above was tested with last carbidopa levodopa medication more than 12 hours ago.  After taking 2 tablets, 45 minutes, he has significant improvements in facial expression, spontaneity of body movements decreased muscle tone and gait.  Showed great response to the medication.      Michael Cobb MD  Movement  Disorders Fellow    Visited and examined the patient with Dr. Cobb.  Agree with his assessment and plans.

## 2021-06-23 DIAGNOSIS — R41.3 MEMORY DIFFICULTY: ICD-10-CM

## 2021-06-23 DIAGNOSIS — R41.3 MEMORY DIFFICULTY: Primary | ICD-10-CM

## 2021-06-23 DIAGNOSIS — G20.A1 PARKINSON DISEASE (H): ICD-10-CM

## 2021-06-23 RX ORDER — CARBIDOPA AND LEVODOPA 50; 200 MG/1; MG/1
TABLET, EXTENDED RELEASE ORAL
Qty: 90 TABLET | OUTPATIENT
Start: 2021-06-23

## 2021-06-23 RX ORDER — CARBIDOPA AND LEVODOPA 50; 200 MG/1; MG/1
TABLET, EXTENDED RELEASE ORAL
Qty: 30 TABLET | Refills: 0 | Status: SHIPPED | OUTPATIENT
Start: 2021-06-23 | End: 2021-08-06

## 2021-06-23 RX ORDER — CARBIDOPA AND LEVODOPA 25; 100 MG/1; MG/1
1.5 TABLET ORAL 3 TIMES DAILY
Qty: 135 TABLET | Refills: 0 | Status: SHIPPED | OUTPATIENT
Start: 2021-06-23 | End: 2021-08-06

## 2021-06-23 RX ORDER — CARBIDOPA AND LEVODOPA 25; 100 MG/1; MG/1
1.5 TABLET ORAL 3 TIMES DAILY
Qty: 405 TABLET | OUTPATIENT
Start: 2021-06-23

## 2021-06-23 RX ORDER — DONEPEZIL HYDROCHLORIDE 10 MG/1
TABLET, FILM COATED ORAL
Qty: 90 TABLET | OUTPATIENT
Start: 2021-06-23

## 2021-06-23 RX ORDER — DONEPEZIL HYDROCHLORIDE 10 MG/1
10 TABLET, FILM COATED ORAL AT BEDTIME
Qty: 30 TABLET | Refills: 0 | Status: SHIPPED | OUTPATIENT
Start: 2021-06-23 | End: 2021-08-06

## 2021-06-23 NOTE — LETTER
6/23/2021        RE: Gaurav Aldridge  [address] [facility] [place] [address]                Dear Gaurav,          We recently provided you with medication refills.  Many medications require routine follow-up with your doctor.    Your prescription(s) have been refilled for 30 days so you may have time for the above noted follow-up. Please call to schedule soon so we can assure you have an appointment before your next refills are needed. If you have already made a follow up appointment, please disregard this letter.     Dr. Michael is no longer at our clinic. Please call to schedule with another provider.    Sincerely,        [facility]     (Formerly known as [facility])

## 2021-06-23 NOTE — TELEPHONE ENCOUNTER
Refill requests for carbidopa-levodopa , carbidopa-levodopa CR  and donepezil.  Dr. Michael pt. Last seen 10/5/20.  Pt due for follow up. No future appt scheduled.  Letter mailed to pt to remind to schedule.  Medication T'd for review and signature    Yvonne Johnson LPN on 6/23/2021 at 12:18 PM

## 2021-07-03 NOTE — ADDENDUM NOTE
Addendum Note by Yuki Watters PT at 7/6/2017 11:59 PM     Author: Yuki Watters PT Service: -- Author Type: Physical Therapist    Filed: 7/24/2017 11:13 AM Date of Service: 7/6/2017 11:59 PM Status: Signed    : Yuki Watters PT (Physical Therapist)    Encounter addended by: Yuki Watters PT on: 7/24/2017 11:13 AM<BR>     Actions taken: Sign clinical note

## 2021-07-03 NOTE — ADDENDUM NOTE
Addendum Note by Paul Abraham PT at 5/16/2018 11:59 PM     Author: Paul Abraham PT Service: -- Author Type: Physical Therapist    Filed: 5/17/2018 10:44 AM Date of Service: 5/16/2018 11:59 PM Status: Signed    : Paul Abraham PT (Physical Therapist)    Encounter addended by: Paul Abraham PT on: 5/17/2018 10:44 AM<BR>     Actions taken: Sign clinical note

## 2021-07-03 NOTE — ADDENDUM NOTE
Addendum Note by Felicitas Gudino RN at 8/21/2017 11:59 PM     Author: Felicitas Gudino RN Service: -- Author Type: Registered Nurse    Filed: 8/30/2017  8:41 AM Date of Service: 8/21/2017 11:59 PM Status: Signed    : Felicitas Gudino RN (Registered Nurse)    Encounter addended by: Felicitas Gudino RN on: 8/30/2017  8:41 AM<BR>     Actions taken: Visit diagnoses modified, Charge Capture section accepted

## 2021-07-03 NOTE — ADDENDUM NOTE
Addendum Note by Keyla Juan MA at 4/25/2018 10:38 AM     Author: Keyla Juan MA Service: -- Author Type: Medical Assistant    Filed: 4/25/2018 10:38 AM Date of Service: 4/25/2018 10:38 AM Status: Signed    : Keyla Juan MA (Medical Assistant)    Encounter addended by: Keyla Juan MA on: 4/25/2018 10:38 AM<BR>     Actions taken: Order Reconciliation Section accessed,  activity accessed, Outside historical medications filed

## 2021-07-03 NOTE — ADDENDUM NOTE
Addendum Note by Corazon Michael MD at 9/26/2018  6:28 PM     Author: Corazon Michael MD Service: Neurology Author Type: Physician    Filed: 9/26/2018  6:28 PM Date of Service: 9/26/2018  6:28 PM Status: Signed    : Corazon Michael MD (Physician)    Encounter addended by: Corazon Michael MD on: 9/26/2018  6:28 PM<BR>     Actions taken: Order Reconciliation Section accessed, Problem List reviewed, Charge Capture section accepted, Sign clinical note

## 2021-07-03 NOTE — ADDENDUM NOTE
Addendum Note by Radha Bee at 7/17/2017  2:38 PM     Author: Radha Bee Service: -- Author Type: Medical Assistant    Filed: 7/24/2017 10:41 AM Date of Service: 7/17/2017  2:38 PM Status: Signed    : Radha Bee    Encounter addended by: Radha Bee MA on: 7/24/2017 10:41 AM<BR>     Actions taken: Charge Capture section accepted

## 2021-07-03 NOTE — ADDENDUM NOTE
Addendum Note by Kathleen Zuñiga CMA at 5/1/2019 11:59 PM     Author: Kathleen Zuñiga CMA Service: -- Author Type: Certified Medical Assistant    Filed: 6/12/2019  9:39 AM Date of Service: 5/1/2019 11:59 PM Status: Signed    : Kathleen Zuñiga CMA (Certified Medical Assistant)    Encounter addended by: Kathleen Zuñiga CMA on: 6/12/2019  9:39 AM      Actions taken: Charge Capture section accepted

## 2021-07-03 NOTE — ADDENDUM NOTE
Addendum Note by Jerilyn Eldridge CMA at 4/25/2018 11:32 AM     Author: Jerilyn Eldridge CMA Service: -- Author Type: Certified Medical Assistant    Filed: 4/25/2018 11:32 AM Date of Service: 4/25/2018 11:32 AM Status: Signed    : Jerilyn Eldridge CMA (Certified Medical Assistant)    Encounter addended by: Jerilyn Eldridge CMA on: 4/25/2018 11:32 AM<BR>     Actions taken: Charge Capture section accepted

## 2021-07-03 NOTE — ADDENDUM NOTE
Addendum Note by Alison Byrd CMA at 6/26/2017 11:59 PM     Author: Alison Byrd CMA Service: -- Author Type: Certified Medical Assistant    Filed: 7/6/2017  8:21 AM Date of Service: 6/26/2017 11:59 PM Status: Signed    : Alison Byrd CMA (Certified Medical Assistant)    Encounter addended by: Alison Byrd CMA on: 7/6/2017  8:21 AM<BR>     Actions taken: Charge Capture section accepted

## 2021-07-03 NOTE — ADDENDUM NOTE
Addendum Note by Paul Abraham PT at 7/24/2017 12:18 PM     Author: Paul Abraham PT Service: -- Author Type: Physical Therapist    Filed: 7/24/2017 12:18 PM Date of Service: 7/24/2017 12:18 PM Status: Signed    : Paul Abraham PT (Physical Therapist)    Encounter addended by: Paul Abraham PT on: 7/24/2017 12:18 PM<BR>     Actions taken: Sign clinical note

## 2021-08-06 DIAGNOSIS — G20.A1 PARKINSON DISEASE (H): ICD-10-CM

## 2021-08-06 DIAGNOSIS — R41.3 MEMORY DIFFICULTY: ICD-10-CM

## 2021-08-06 NOTE — TELEPHONE ENCOUNTER
Refill request for carbidopa-levodopa, CR, and donepezil   Letter mailed to pt 6/23/21. No future appts made  14 day supply with 0 refills   Medication T'd for review and signature  Jo Pulliam MA on 8/6/2021 at 2:54 PM

## 2021-08-08 RX ORDER — CARBIDOPA AND LEVODOPA 25; 100 MG/1; MG/1
1.5 TABLET ORAL 3 TIMES DAILY
Qty: 63 TABLET | Refills: 0 | Status: SHIPPED | OUTPATIENT
Start: 2021-08-08

## 2021-08-08 RX ORDER — DONEPEZIL HYDROCHLORIDE 10 MG/1
10 TABLET, FILM COATED ORAL AT BEDTIME
Qty: 14 TABLET | Refills: 0 | Status: SHIPPED | OUTPATIENT
Start: 2021-08-08

## 2021-08-08 RX ORDER — CARBIDOPA AND LEVODOPA 50; 200 MG/1; MG/1
TABLET, EXTENDED RELEASE ORAL
Qty: 14 TABLET | Refills: 0 | Status: SHIPPED | OUTPATIENT
Start: 2021-08-08